# Patient Record
Sex: FEMALE | Race: WHITE | Employment: FULL TIME | URBAN - METROPOLITAN AREA
[De-identification: names, ages, dates, MRNs, and addresses within clinical notes are randomized per-mention and may not be internally consistent; named-entity substitution may affect disease eponyms.]

---

## 2018-12-11 PROBLEM — E66.01 OBESITY, MORBID (HCC): Status: ACTIVE | Noted: 2018-12-11

## 2019-02-27 ENCOUNTER — HOSPITAL ENCOUNTER (EMERGENCY)
Age: 54
Discharge: HOME | End: 2019-02-27
Attending: EMERGENCY MEDICINE
Payer: COMMERCIAL

## 2019-02-27 ENCOUNTER — APPOINTMENT (OUTPATIENT)
Dept: GENERAL RADIOLOGY | Age: 54
End: 2019-02-27
Attending: EMERGENCY MEDICINE
Payer: COMMERCIAL

## 2019-02-27 ENCOUNTER — APPOINTMENT (OUTPATIENT)
Dept: CT IMAGING | Age: 54
End: 2019-02-27
Payer: COMMERCIAL

## 2019-02-27 VITALS
RESPIRATION RATE: 17 BRPM | HEIGHT: 63 IN | SYSTOLIC BLOOD PRESSURE: 149 MMHG | WEIGHT: 275 LBS | OXYGEN SATURATION: 98 % | HEART RATE: 81 BPM | BODY MASS INDEX: 48.73 KG/M2 | DIASTOLIC BLOOD PRESSURE: 73 MMHG | TEMPERATURE: 98 F

## 2019-02-27 DIAGNOSIS — S20.219A CONTUSION OF CHEST WALL, UNSPECIFIED LATERALITY, INITIAL ENCOUNTER: ICD-10-CM
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
## 2019-02-27 DIAGNOSIS — S62.91XA CLOSED FRACTURE OF RIGHT HAND, INITIAL ENCOUNTER: ICD-10-CM

## 2019-02-27 LAB
ANION GAP SERPL CALC-SCNC: 11 MMOL/L (ref 5–15)
BASOPHILS # BLD: 0.1 K/UL (ref 0–0.1)
BASOPHILS NFR BLD: 0 % (ref 0–1)
BUN SERPL-MCNC: 19 MG/DL (ref 6–20)
BUN/CREAT SERPL: 23 (ref 12–20)
CALCIUM SERPL-MCNC: 8.8 MG/DL (ref 8.5–10.1)
CHLORIDE SERPL-SCNC: 103 MMOL/L (ref 97–108)
CO2 SERPL-SCNC: 26 MMOL/L (ref 21–32)
CREAT BLD-MCNC: 0.7 MG/DL (ref 0.6–1.3)
CREAT SERPL-MCNC: 0.83 MG/DL (ref 0.55–1.02)
DIFFERENTIAL METHOD BLD: ABNORMAL
EOSINOPHIL # BLD: 0.3 K/UL (ref 0–0.4)
EOSINOPHIL NFR BLD: 2 % (ref 0–7)
ERYTHROCYTE [DISTWIDTH] IN BLOOD BY AUTOMATED COUNT: 14.5 % (ref 11.5–14.5)
GLUCOSE SERPL-MCNC: 92 MG/DL (ref 65–100)
HCT VFR BLD AUTO: 42 % (ref 35–47)
HGB BLD-MCNC: 13.6 G/DL (ref 11.5–16)
IMM GRANULOCYTES # BLD AUTO: 0.1 K/UL (ref 0–0.04)
IMM GRANULOCYTES NFR BLD AUTO: 1 % (ref 0–0.5)
LYMPHOCYTES # BLD: 3.3 K/UL (ref 0.8–3.5)
LYMPHOCYTES NFR BLD: 19 % (ref 12–49)
MCH RBC QN AUTO: 27.4 PG (ref 26–34)
MCHC RBC AUTO-ENTMCNC: 32.4 G/DL (ref 30–36.5)
MCV RBC AUTO: 84.5 FL (ref 80–99)
MONOCYTES # BLD: 1.1 K/UL (ref 0–1)
MONOCYTES NFR BLD: 6 % (ref 5–13)
NEUTS SEG # BLD: 12.6 K/UL (ref 1.8–8)
NEUTS SEG NFR BLD: 72 % (ref 32–75)
NRBC # BLD: 0 K/UL (ref 0–0.01)
NRBC BLD-RTO: 0 PER 100 WBC
PLATELET # BLD AUTO: 378 K/UL (ref 150–400)
PMV BLD AUTO: 10 FL (ref 8.9–12.9)
POTASSIUM SERPL-SCNC: 3.7 MMOL/L (ref 3.5–5.1)
RBC # BLD AUTO: 4.97 M/UL (ref 3.8–5.2)
SODIUM SERPL-SCNC: 140 MMOL/L (ref 136–145)
WBC # BLD AUTO: 17.4 K/UL (ref 3.6–11)

## 2019-02-27 PROCEDURE — 71046 X-RAY EXAM CHEST 2 VIEWS: CPT

## 2019-02-27 PROCEDURE — 74177 CT ABD & PELVIS W/CONTRAST: CPT

## 2019-02-27 PROCEDURE — 71260 CT THORAX DX C+: CPT

## 2019-02-27 PROCEDURE — 74011636320 HC RX REV CODE- 636/320: Performed by: EMERGENCY MEDICINE

## 2019-02-27 PROCEDURE — 80048 BASIC METABOLIC PNL TOTAL CA: CPT

## 2019-02-27 PROCEDURE — 36415 COLL VENOUS BLD VENIPUNCTURE: CPT

## 2019-02-27 PROCEDURE — 99284 EMERGENCY DEPT VISIT MOD MDM: CPT

## 2019-02-27 PROCEDURE — 75810000053 HC SPLINT APPLICATION

## 2019-02-27 PROCEDURE — 73562 X-RAY EXAM OF KNEE 3: CPT

## 2019-02-27 PROCEDURE — 73130 X-RAY EXAM OF HAND: CPT

## 2019-02-27 PROCEDURE — 82565 ASSAY OF CREATININE: CPT

## 2019-02-27 PROCEDURE — 74011250637 HC RX REV CODE- 250/637: Performed by: EMERGENCY MEDICINE

## 2019-02-27 PROCEDURE — 85025 COMPLETE CBC W/AUTO DIFF WBC: CPT

## 2019-02-27 RX ORDER — SODIUM CHLORIDE 0.9 % (FLUSH) 0.9 %
10 SYRINGE (ML) INJECTION
Status: COMPLETED | OUTPATIENT
Start: 2019-02-27 | End: 2019-02-27

## 2019-02-27 RX ORDER — IBUPROFEN 400 MG/1
800 TABLET ORAL ONCE
Status: COMPLETED | OUTPATIENT
Start: 2019-02-27 | End: 2019-02-27

## 2019-02-27 RX ORDER — SODIUM CHLORIDE 0.9 % (FLUSH) 0.9 %
5-40 SYRINGE (ML) INJECTION AS NEEDED
Status: DISCONTINUED | OUTPATIENT
Start: 2019-02-27 | End: 2019-02-27 | Stop reason: HOSPADM

## 2019-02-27 RX ORDER — SODIUM CHLORIDE 0.9 % (FLUSH) 0.9 %
5-40 SYRINGE (ML) INJECTION EVERY 8 HOURS
Status: DISCONTINUED | OUTPATIENT
Start: 2019-02-27 | End: 2019-02-27 | Stop reason: HOSPADM

## 2019-02-27 RX ORDER — OXYCODONE AND ACETAMINOPHEN 5; 325 MG/1; MG/1
1 TABLET ORAL
Qty: 12 TAB | Refills: 0 | Status: SHIPPED | OUTPATIENT
Start: 2019-02-27 | End: 2019-03-02

## 2019-02-27 RX ORDER — OXYCODONE AND ACETAMINOPHEN 5; 325 MG/1; MG/1
1 TABLET ORAL ONCE
Status: COMPLETED | OUTPATIENT
Start: 2019-02-27 | End: 2019-02-27

## 2019-02-27 RX ADMIN — OXYCODONE AND ACETAMINOPHEN 1 TABLET: 5; 325 TABLET ORAL at 16:49

## 2019-02-27 RX ADMIN — IBUPROFEN 800 MG: 400 TABLET, FILM COATED ORAL at 16:49

## 2019-02-27 RX ADMIN — Medication 10 ML: at 18:35

## 2019-02-27 RX ADMIN — IOPAMIDOL 100 ML: 755 INJECTION, SOLUTION INTRAVENOUS at 18:35
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
## 2019-02-28 NOTE — ED NOTES
Pt discharged by Dr. Antonio Sibley. Pt provided with discharge instructions Rx and instructions on follow up care. Pt out of ED in stable condition accompanied by .

## 2019-03-06 ENCOUNTER — HOSPITAL ENCOUNTER (EMERGENCY)
Age: 54
Discharge: HOME | End: 2019-03-06
Attending: EMERGENCY MEDICINE
Payer: MEDICAID

## 2019-03-06 ENCOUNTER — APPOINTMENT (OUTPATIENT)
Dept: GENERAL RADIOLOGY | Age: 54
End: 2019-03-06
Attending: EMERGENCY MEDICINE
Payer: MEDICAID

## 2019-03-06 ENCOUNTER — APPOINTMENT (OUTPATIENT)
Dept: ULTRASOUND IMAGING | Age: 54
End: 2019-03-06
Attending: EMERGENCY MEDICINE
Payer: MEDICAID

## 2019-03-06 ENCOUNTER — APPOINTMENT (OUTPATIENT)
Dept: CT IMAGING | Age: 54
End: 2019-03-06
Attending: EMERGENCY MEDICINE
Payer: MEDICAID

## 2019-03-06 VITALS
TEMPERATURE: 98.4 F | DIASTOLIC BLOOD PRESSURE: 79 MMHG | BODY MASS INDEX: 51.37 KG/M2 | HEART RATE: 72 BPM | HEIGHT: 63 IN | WEIGHT: 289.9 LBS | SYSTOLIC BLOOD PRESSURE: 148 MMHG | OXYGEN SATURATION: 93 %

## 2019-03-06 DIAGNOSIS — G44.89 OTHER HEADACHE SYNDROME: Primary | ICD-10-CM

## 2019-03-06 DIAGNOSIS — M79.605 LEFT LEG PAIN: ICD-10-CM

## 2019-03-06 DIAGNOSIS — S80.12XA TRAUMATIC HEMATOMA OF LEFT LOWER LEG, INITIAL ENCOUNTER: ICD-10-CM
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
## 2019-03-06 PROCEDURE — 73590 X-RAY EXAM OF LOWER LEG: CPT

## 2019-03-06 PROCEDURE — 93971 EXTREMITY STUDY: CPT

## 2019-03-06 PROCEDURE — 74011250637 HC RX REV CODE- 250/637: Performed by: EMERGENCY MEDICINE

## 2019-03-06 PROCEDURE — 70450 CT HEAD/BRAIN W/O DYE: CPT

## 2019-03-06 PROCEDURE — 99282 EMERGENCY DEPT VISIT SF MDM: CPT

## 2019-03-06 RX ORDER — BUTALBITAL, ACETAMINOPHEN AND CAFFEINE 50; 325; 40 MG/1; MG/1; MG/1
1 TABLET ORAL
Status: DISCONTINUED | OUTPATIENT
Start: 2019-03-06 | End: 2019-03-06 | Stop reason: HOSPADM

## 2019-03-06 RX ORDER — IBUPROFEN 800 MG/1
800 TABLET ORAL
Qty: 20 TAB | Refills: 0 | Status: SHIPPED | OUTPATIENT
Start: 2019-03-06 | End: 2019-03-13
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
## 2019-03-06 NOTE — ED NOTES
Megan Taylor, [facility] has reviewed discharge instructions with the patient. The patient verbalized understanding. Pt ambulatory home with family, discharge papers in hand.

## 2019-03-06 NOTE — LETTER
[facility] EMERGENCY DEPT 
[address] [place] [address] [address] [id number] [phone] Work/School Note Date: 3/6/2019 To Whom It May concern: 
 
Forrest Brown was seen and treated today in the emergency room by the following provider(s): 
Attending Provider: Bety Larios DO Physician Assistant: CAYETANO Fernandez. Forrest Brown may return to work on [id number]. Sincerely, CAYETANO Schaffer

## 2019-04-02 ENCOUNTER — HOSPITAL ENCOUNTER (OUTPATIENT)
Dept: GENERAL RADIOLOGY | Age: 54
Discharge: HOME | End: 2019-04-02
Attending: INTERNAL MEDICINE
Payer: MEDICAID

## 2019-04-02 DIAGNOSIS — R07.9 CHEST PAIN, UNSPECIFIED TYPE: ICD-10-CM

## 2019-04-02 PROCEDURE — 71101 X-RAY EXAM UNILAT RIBS/CHEST: CPT

## 2019-07-16 ENCOUNTER — OFFICE VISIT (OUTPATIENT)
Dept: URGENT CARE | Age: 54
End: 2019-07-16

## 2019-07-16 VITALS
SYSTOLIC BLOOD PRESSURE: 145 MMHG | HEART RATE: 77 BPM | RESPIRATION RATE: 18 BRPM | OXYGEN SATURATION: 96 % | TEMPERATURE: 97.9 F | DIASTOLIC BLOOD PRESSURE: 74 MMHG | WEIGHT: 290 LBS | BODY MASS INDEX: 51.38 KG/M2 | HEIGHT: 63 IN

## 2019-07-16 DIAGNOSIS — J40 BRONCHITIS: Primary | ICD-10-CM

## 2019-07-16 RX ORDER — CLONAZEPAM 0.5 MG/1
0.5 TABLET ORAL
COMMUNITY
End: 2019-07-16

## 2019-07-16 RX ORDER — LEVALBUTEROL INHALATION SOLUTION 0.31 MG/3ML
0.31 SOLUTION RESPIRATORY (INHALATION)
COMMUNITY
Start: 2019-07-16 | End: 2019-07-16

## 2019-07-16 RX ORDER — ZOLPIDEM TARTRATE 5 MG/1
TABLET ORAL
Refills: 1 | COMMUNITY
Start: 2019-06-09 | End: 2020-11-30

## 2019-07-16 RX ORDER — TRAMADOL HYDROCHLORIDE 50 MG/1
50 TABLET ORAL
COMMUNITY
Start: 2015-06-30 | End: 2019-11-12

## 2019-07-16 RX ORDER — ALBUTEROL SULFATE 90 UG/1
2 AEROSOL, METERED RESPIRATORY (INHALATION)
COMMUNITY
Start: 2019-01-17 | End: 2020-01-17

## 2019-07-16 RX ORDER — ALBUTEROL SULFATE 90 UG/1
2 AEROSOL, METERED RESPIRATORY (INHALATION)
COMMUNITY
Start: 2019-07-16 | End: 2019-07-16

## 2019-07-16 RX ORDER — PREDNISONE 10 MG/1
TABLET ORAL
COMMUNITY
Start: 2019-07-16 | End: 2020-11-30

## 2019-07-16 NOTE — PROGRESS NOTES
The history is provided by the patient. Cough   This is a new problem. Episode onset: 3-4 weeks ago. The problem occurs constantly. The problem has not changed since onset. The cough is non-productive. There has been no fever. Associated symptoms include shortness of breath (with exertion) and wheezing. Pertinent negatives include no chest pain, no chills, no sweats, no headaches, no rhinorrhea, no sore throat, no myalgias, no nausea, no vomiting and no confusion. Treatments tried: saw  at [facility], given xopenex and prednisone. The treatment provided moderate relief. She is not a smoker. Her past medical history is significant for pneumonia. Her past medical history does not include bronchitis, COPD or asthma. Past Medical History:   Diagnosis Date    Insomnia 9/1/2015    Lumbago     Obesity 2/4/2015    RLS (restless legs syndrome)         Past Surgical History:   Procedure Laterality Date    HX BACK SURGERY      HX CHOLECYSTECTOMY      HX TUBAL LIGATION           History reviewed. No pertinent family history.      Social History     Socioeconomic History    Marital status:      Spouse name: Not on file    Number of children: Not on file    Years of education: Not on file    Highest education level: Not on file   Occupational History    Not on file   Social Needs    Financial resource strain: Not on file    Food insecurity:     Worry: Not on file     Inability: Not on file    Transportation needs:     Medical: Not on file     Non-medical: Not on file   Tobacco Use    Smoking status: Never Smoker    Smokeless tobacco: Never Used   Substance and Sexual Activity    Alcohol use: Not on file    Drug use: Not on file    Sexual activity: Not on file   Lifestyle    Physical activity:     Days per week: Not on file     Minutes per session: Not on file    Stress: Not on file   Relationships    Social connections:     Talks on phone: Not on file     Gets together: Not on file Attends [employer] service: Not on file     Active member of club or organization: Not on file     Attends meetings of clubs or organizations: Not on file     Relationship status: Not on file    Intimate partner violence:     Fear of current or ex partner: Not on file     Emotionally abused: Not on file     Physically abused: Not on file     Forced sexual activity: Not on file   Other Topics Concern    Not on file   Social History Narrative    Not on file                ALLERGIES: Patient has no known allergies. Review of Systems   Constitutional: Negative for activity change, chills, fatigue and fever. HENT: Negative for rhinorrhea and sore throat. Respiratory: Positive for cough, shortness of breath (with exertion) and wheezing. Negative for choking, chest tightness and stridor. Cardiovascular: Negative for chest pain, palpitations and leg swelling. Gastrointestinal: Negative for abdominal pain, nausea and vomiting. Musculoskeletal: Negative for myalgias. Neurological: Negative for headaches. Psychiatric/Behavioral: Negative for confusion. Vitals:    07/16/19 1154   BP: 145/74   Pulse: 77   Resp: 18   Temp: 97.9 °F (36.6 °C)   SpO2: 96%   Weight: 290 lb (131.5 kg)   Height: 5' 3\" (1.6 m)       Physical Exam   Constitutional: She is oriented to person, place, and time. She appears well-developed and well-nourished. No distress. Elevated BMI   HENT:   Nose: Nose normal.   Mouth/Throat: Oropharynx is clear and moist. No oropharyngeal exudate. Eyes: Pupils are equal, round, and reactive to light. Conjunctivae and EOM are normal. Right eye exhibits no discharge. Left eye exhibits no discharge. No scleral icterus. Neck: Normal range of motion. Neck supple. No JVD present. Cardiovascular: Normal rate, regular rhythm, normal heart sounds and intact distal pulses. No murmur heard. Pulmonary/Chest: Effort normal. No stridor. No respiratory distress. She has wheezes (during cough).  She has no rales. Musculoskeletal: She exhibits no edema. Neurological: She is alert and oriented to person, place, and time. Skin: Skin is warm and dry. No rash noted. She is not diaphoretic. Psychiatric: She has a normal mood and affect. Nursing note and vitals reviewed. MDM     Differential Diagnosis; Clinical Impression; Plan:     Acute bronchitis with ongoing cough. Pt nontoxic, nl resp effort, afebrile. CXR clear. Already rx steroids and albuterol by [employer] DO. No abx targets reached.        Procedures

## 2019-07-16 NOTE — PATIENT INSTRUCTIONS
Bronchitis: Care Instructions  Your Care Instructions    Bronchitis is inflammation of the bronchial tubes, which carry air to the lungs. The tubes swell and produce mucus, or phlegm. The mucus and inflamed bronchial tubes make you cough. You may have trouble breathing. Most cases of bronchitis are caused by viruses like those that cause colds. Antibiotics usually do not help and they may be harmful. Bronchitis usually develops rapidly and lasts about 2 to 3 weeks in otherwise healthy people. Follow-up care is a key part of your treatment and safety. Be sure to make and go to all appointments, and call your doctor if you are having problems. It's also a good idea to know your test results and keep a list of the medicines you take. How can you care for yourself at home? · Take all medicines exactly as prescribed. Call your doctor if you think you are having a problem with your medicine. · Get some extra rest.  · Take an over-the-counter pain medicine, such as acetaminophen (Tylenol), ibuprofen (Advil, Motrin), or naproxen (Aleve) to reduce fever and relieve body aches. Read and follow all instructions on the label. · Do not take two or more pain medicines at the same time unless the doctor told you to. Many pain medicines have acetaminophen, which is Tylenol. Too much acetaminophen (Tylenol) can be harmful. · Take an over-the-counter cough medicine that contains dextromethorphan to help quiet a dry, hacking cough so that you can sleep. Avoid cough medicines that have more than one active ingredient. Read and follow all instructions on the label. · Breathe moist air from a humidifier, hot shower, or sink filled with hot water. The heat and moisture will thin mucus so you can cough it out. · Do not smoke. Smoking can make bronchitis worse. If you need help quitting, talk to your doctor about stop-smoking programs and medicines. These can increase your chances of quitting for good.   When should you call for help? Call 911 anytime you think you may need emergency care. For example, call if:    · You have severe trouble breathing.    Call your doctor now or seek immediate medical care if:    · You have new or worse trouble breathing.     · You cough up dark brown or bloody mucus (sputum).     · You have a new or higher fever.     · You have a new rash.    Watch closely for changes in your health, and be sure to contact your doctor if:    · You cough more deeply or more often, especially if you notice more mucus or a change in the color of your mucus.     · You are not getting better as expected. Where can you learn more? Go to http://marcos-toshia.info/. Enter H333 in the search box to learn more about \"Bronchitis: Care Instructions. \"  Current as of: September 5, 2018  Content Version: 11.9  © 9168-9997 [employer]. Care instructions adapted under license by [employer] (which disclaims liability or warranty for this information). If you have questions about a medical condition or this instruction, always ask your healthcare professional. [address] any warranty or liability for your use of this information.

## 2020-04-20 PROBLEM — S62.91XA HAND FRACTURE, RIGHT: Status: ACTIVE | Noted: 2019-02-27

## 2020-09-18 ENCOUNTER — HOSPITAL ENCOUNTER (OUTPATIENT)
Dept: GENERAL RADIOLOGY | Age: 55
Discharge: HOME | End: 2020-09-18
Attending: SPECIALIST
Payer: COMMERCIAL

## 2020-09-18 DIAGNOSIS — M25.552 LEFT HIP PAIN: ICD-10-CM

## 2020-09-18 PROCEDURE — 73502 X-RAY EXAM HIP UNI 2-3 VIEWS: CPT

## 2020-11-30 PROBLEM — R25.1 TREMOR, UNSPECIFIED: Chronic | Status: ACTIVE | Noted: 2020-11-30

## 2020-12-02 PROBLEM — E78.49 OTHER HYPERLIPIDEMIA: Status: ACTIVE | Noted: 2020-11-30

## 2020-12-08 ENCOUNTER — HOSPITAL ENCOUNTER (OUTPATIENT)
Dept: MAMMOGRAPHY | Age: 55
Discharge: HOME | End: 2020-12-08
Attending: INTERNAL MEDICINE
Payer: COMMERCIAL

## 2020-12-08 DIAGNOSIS — Z12.31 BREAST CANCER SCREENING BY MAMMOGRAM: ICD-10-CM

## 2020-12-08 PROCEDURE — 77067 SCR MAMMO BI INCL CAD: CPT

## 2021-07-08 ENCOUNTER — HOSPITAL ENCOUNTER (EMERGENCY)
Age: 56
Discharge: HOME | End: 2021-07-08
Attending: STUDENT IN AN ORGANIZED HEALTH CARE EDUCATION/TRAINING PROGRAM
Payer: COMMERCIAL

## 2021-07-08 VITALS
RESPIRATION RATE: 22 BRPM | HEART RATE: 71 BPM | TEMPERATURE: 98 F | DIASTOLIC BLOOD PRESSURE: 77 MMHG | SYSTOLIC BLOOD PRESSURE: 131 MMHG | OXYGEN SATURATION: 94 %

## 2021-07-08 DIAGNOSIS — M54.10 BACK PAIN WITH LEFT-SIDED RADICULOPATHY: Primary | ICD-10-CM

## 2021-07-08 PROCEDURE — 96375 TX/PRO/DX INJ NEW DRUG ADDON: CPT

## 2021-07-08 PROCEDURE — 96374 THER/PROPH/DIAG INJ IV PUSH: CPT

## 2021-07-08 PROCEDURE — 74011250636 HC RX REV CODE- 250/636: Performed by: STUDENT IN AN ORGANIZED HEALTH CARE EDUCATION/TRAINING PROGRAM

## 2021-07-08 PROCEDURE — 99283 EMERGENCY DEPT VISIT LOW MDM: CPT

## 2021-07-08 RX ORDER — DIAZEPAM 10 MG/2ML
5 INJECTION INTRAMUSCULAR
Status: COMPLETED | OUTPATIENT
Start: 2021-07-08 | End: 2021-07-08

## 2021-07-08 RX ORDER — CYCLOBENZAPRINE HCL 10 MG
10 TABLET ORAL
Qty: 20 TABLET | Refills: 0 | Status: SHIPPED | OUTPATIENT
Start: 2021-07-08 | End: 2022-02-03

## 2021-07-08 RX ORDER — MORPHINE SULFATE 2 MG/ML
4 INJECTION, SOLUTION INTRAMUSCULAR; INTRAVENOUS
Status: COMPLETED | OUTPATIENT
Start: 2021-07-08 | End: 2021-07-08

## 2021-07-08 RX ORDER — NAPROXEN 500 MG/1
500 TABLET ORAL
Qty: 30 TABLET | Refills: 0 | Status: SHIPPED | OUTPATIENT
Start: 2021-07-08 | End: 2022-02-03

## 2021-07-08 RX ORDER — LIDOCAINE 4 G/100G
1 PATCH TOPICAL EVERY 12 HOURS
Qty: 5 PATCH | Refills: 2 | Status: SHIPPED | OUTPATIENT
Start: 2021-07-08 | End: 2022-06-27

## 2021-07-08 RX ORDER — KETOROLAC TROMETHAMINE 30 MG/ML
30 INJECTION, SOLUTION INTRAMUSCULAR; INTRAVENOUS
Status: COMPLETED | OUTPATIENT
Start: 2021-07-08 | End: 2021-07-08

## 2021-07-08 RX ORDER — PREDNISONE 20 MG/1
40 TABLET ORAL DAILY
Qty: 10 TABLET | Refills: 0 | Status: SHIPPED | OUTPATIENT
Start: 2021-07-08 | End: 2021-07-13

## 2021-07-08 RX ADMIN — DIAZEPAM 5 MG: 5 INJECTION, SOLUTION INTRAMUSCULAR; INTRAVENOUS at 07:46

## 2021-07-08 RX ADMIN — MORPHINE SULFATE 4 MG: 2 INJECTION, SOLUTION INTRAMUSCULAR; INTRAVENOUS at 09:45

## 2021-07-08 RX ADMIN — KETOROLAC TROMETHAMINE 30 MG: 30 INJECTION, SOLUTION INTRAMUSCULAR; INTRAVENOUS at 07:46

## 2021-07-08 NOTE — DISCHARGE INSTRUCTIONS
It was a pleasure taking care of you in our Emergency Department today. We know that when you come to [facility], you are entrusting us with your health, comfort, and safety. Our physicians and nurses honor that trust, and truly appreciate the opportunity to care for you and your loved ones. We also value your feedback. If you receive a survey about your Emergency Department experience today, please fill it out. We care about our patients' feedback, and we listen to what you have to say. Thank you!     Chantel Le MD
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
## 2021-08-17 ENCOUNTER — TRANSCRIBE ORDER (OUTPATIENT)
Dept: SCHEDULING | Age: 56
End: 2021-08-17

## 2021-08-17 DIAGNOSIS — M54.16 LUMBAR RADICULOPATHY: Primary | ICD-10-CM

## 2021-09-07 ENCOUNTER — HOSPITAL ENCOUNTER (OUTPATIENT)
Dept: MRI IMAGING | Age: 56
Discharge: HOME | End: 2021-09-07
Attending: NEUROLOGICAL SURGERY
Payer: COMMERCIAL

## 2021-09-07 DIAGNOSIS — M54.16 LUMBAR RADICULOPATHY: ICD-10-CM

## 2021-09-07 PROCEDURE — 74011250636 HC RX REV CODE- 250/636: Performed by: NEUROLOGICAL SURGERY

## 2021-09-07 PROCEDURE — 72158 MRI LUMBAR SPINE W/O & W/DYE: CPT

## 2021-09-07 PROCEDURE — A9576 INJ PROHANCE MULTIPACK: HCPCS | Performed by: NEUROLOGICAL SURGERY

## 2021-09-07 RX ADMIN — GADOTERIDOL 20 ML: 279.3 INJECTION, SOLUTION INTRAVENOUS at 09:28

## 2021-10-19 ENCOUNTER — TRANSCRIBE ORDER (OUTPATIENT)
Dept: SCHEDULING | Age: 56
End: 2021-10-19

## 2021-10-19 DIAGNOSIS — M25.552 LEFT HIP PAIN: Primary | ICD-10-CM

## 2021-10-26 ENCOUNTER — TRANSCRIBE ORDER (OUTPATIENT)
Dept: SCHEDULING | Age: 56
End: 2021-10-26

## 2021-10-26 DIAGNOSIS — M16.12 PRIMARY OSTEOARTHRITIS OF LEFT HIP: Primary | ICD-10-CM

## 2021-11-03 ENCOUNTER — HOSPITAL ENCOUNTER (OUTPATIENT)
Dept: GENERAL RADIOLOGY | Age: 56
Discharge: HOME | End: 2021-11-03
Attending: PHYSICIAN ASSISTANT
Payer: COMMERCIAL

## 2021-11-03 DIAGNOSIS — M16.12 PRIMARY OSTEOARTHRITIS OF LEFT HIP: ICD-10-CM

## 2021-11-03 PROCEDURE — 74011000250 HC RX REV CODE- 250: Performed by: RADIOLOGY

## 2021-11-03 PROCEDURE — 74011250636 HC RX REV CODE- 250/636: Performed by: RADIOLOGY

## 2021-11-03 PROCEDURE — 74011000250 HC RX REV CODE- 250

## 2021-11-03 PROCEDURE — 74011000636 HC RX REV CODE- 636: Performed by: RADIOLOGY

## 2021-11-03 PROCEDURE — 20610 DRAIN/INJ JOINT/BURSA W/O US: CPT

## 2021-11-03 RX ORDER — SODIUM BICARBONATE 42 MG/ML
1 INJECTION, SOLUTION INTRAVENOUS
Status: COMPLETED | OUTPATIENT
Start: 2021-11-03 | End: 2021-11-03

## 2021-11-03 RX ORDER — LIDOCAINE HYDROCHLORIDE 10 MG/ML
INJECTION, SOLUTION EPIDURAL; INFILTRATION; INTRACAUDAL; PERINEURAL
Status: COMPLETED
Start: 2021-11-03 | End: 2021-11-03

## 2021-11-03 RX ORDER — TRIAMCINOLONE ACETONIDE 40 MG/ML
40 INJECTION, SUSPENSION INTRA-ARTICULAR; INTRAMUSCULAR
Status: COMPLETED | OUTPATIENT
Start: 2021-11-03 | End: 2021-11-03

## 2021-11-03 RX ORDER — BUPIVACAINE HYDROCHLORIDE 5 MG/ML
5 INJECTION, SOLUTION EPIDURAL; INTRACAUDAL
Status: COMPLETED | OUTPATIENT
Start: 2021-11-03 | End: 2021-11-03

## 2021-11-03 RX ADMIN — IOHEXOL 5 ML: 180 INJECTION INTRAVENOUS at 10:00

## 2021-11-03 RX ADMIN — TRIAMCINOLONE ACETONIDE 40 MG: 40 INJECTION, SUSPENSION INTRA-ARTICULAR; INTRAMUSCULAR at 10:00

## 2021-11-03 RX ADMIN — BUPIVACAINE HYDROCHLORIDE 2 ML: 5 INJECTION, SOLUTION EPIDURAL; INTRACAUDAL; PERINEURAL at 10:00

## 2021-11-03 RX ADMIN — SODIUM BICARBONATE 2 ML: 42 INJECTION, SOLUTION INTRAVENOUS at 10:00

## 2021-11-03 RX ADMIN — LIDOCAINE HYDROCHLORIDE 5 ML: 10 INJECTION, SOLUTION EPIDURAL; INFILTRATION; INTRACAUDAL; PERINEURAL at 10:00

## 2022-02-03 ENCOUNTER — OFFICE VISIT (OUTPATIENT)
Dept: ORTHOPEDIC SURGERY | Age: 57
End: 2022-02-03
Payer: COMMERCIAL

## 2022-02-03 VITALS — BODY MASS INDEX: 49.51 KG/M2 | HEIGHT: 64 IN | WEIGHT: 290 LBS

## 2022-02-03 DIAGNOSIS — M16.12 ARTHRITIS OF LEFT HIP: ICD-10-CM

## 2022-02-03 DIAGNOSIS — M25.552 LEFT HIP PAIN: Primary | ICD-10-CM

## 2022-02-03 PROCEDURE — 99204 OFFICE O/P NEW MOD 45 MIN: CPT | Performed by: ORTHOPAEDIC SURGERY

## 2022-02-03 RX ORDER — CELECOXIB 200 MG/1
200 CAPSULE ORAL
COMMUNITY
Start: 2022-01-14 | End: 2022-07-12

## 2022-02-03 NOTE — PROGRESS NOTES
Torie Crane (: 1965) is a 64 y.o. female patient, here for evaluation of the following chief complaint(s):  Hip Pain (left hip pain)       ASSESSMENT/PLAN:  Below is the assessment and plan developed based on review of pertinent history, physical exam, labs, studies, and medications. 27-year-old female comes in today complaining of left-sided hip pain. This has been present for 2 years but has drastically worsened recently over the past 6 months. She is now the point where she cannot weight-bear. She can walk without a cane. Pain is severe and is constant. She has pain during the day as well as at night. She has a severe limp. She struggles with mobility because of it. X-rays revealed destruction of the femoral head with subchondral fracture and significant collapse. We discussed total hip arthroplasty. Her BMI is 49. She has lost 15 pounds and I told her if she can lose another 25 to 30 pounds we can go ahead and schedule her surgery. She still would be at elevated risk but she cannot be mobile because of her severity of disease. We are going to see her in 2 months and can revisit the idea of surgery if she has lost weight I also counseled her nutrition and she is seeing a nutritionist.  I also gave her a note for a aqua therapy program.    1. Left hip pain  -     XR HIP LT W OR WO PELV 2-3 VWS; Future  2. Arthritis of left hip      Encounter Diagnoses   Name Primary?  Left hip pain Yes    Arthritis of left hip         No follow-ups on file. SUBJECTIVE/OBJECTIVE:  Torie Crane (: 1965) is a 64 y.o. female who presents today for the following:  Chief Complaint   Patient presents with    Hip Pain     left hip pain       27-year-old female comes in today complaining of left-sided hip pain. This has been present for 2 years but has drastically worsened recently over the past 6 months. She is now the point where she cannot weight-bear. She can walk without a cane.   Pain is severe and is constant. She has pain during the day as well as at night. She has a severe limp. She struggles with mobility because of it. X-rays revealed destruction of the femoral head with subchondral fracture and significant collapse. IMAGING:  XR Results (most recent):  Results from Appointment encounter on 02/03/22    XR HIP LT W OR WO PELV 2-3 VWS    Narrative  AP and lateral x-rays ordered and independently reviewed left hip. Severe end-stage left hip osteoarthritis with fracture of the femoral head and collapse. No Known Allergies    Current Outpatient Medications   Medication Sig    celecoxib (CELEBREX) 200 mg capsule Take 200 mg by mouth daily.  lidocaine 4 % patch 1 Patch by TransDERmal route every twelve (12) hours every twelve (12) hours.  metoprolol succinate (TOPROL-XL) 50 mg XL tablet TAKE 1 TABLET BY MOUTH EVERY DAY    rOPINIRole (REQUIP) 4 mg tab TAB TAKE 1 TABLET BY MOUTH EVERY DAY AT NIGHT. Same dose/frequency of prior pcp.  escitalopram oxalate (LEXAPRO) 20 mg tablet TAKE 1 TABLET BY MOUTH EVERY DAY. For anxiety.  naproxen (NAPROSYN) 500 mg tablet Take 1 Tablet by mouth every twelve (12) hours as needed for Pain.  cyclobenzaprine (FLEXERIL) 10 mg tablet Take 1 Tablet by mouth three (3) times daily as needed for Muscle Spasm(s). No current facility-administered medications for this visit. Past Medical History:   Diagnosis Date    Asthma     mild intermittent    BMI 50.0-59.9, adult ([facility] [address]     Insomnia 9/1/2015    Lumbago     Obesity (BMI 35.0-39.9 without comorbidity)     HERMELINDA treated with BiPAP     RLS (restless legs syndrome)         Past Surgical History:   Procedure Laterality Date    HX BACK SURGERY      HX CHOLECYSTECTOMY      HX TUBAL LIGATION      MALINA STEREO VAC  BX BREAST LT 1ST LESION W/CLIP AND SPECIMEN Left ?    benign       No family history on file.      Social History     Tobacco Use    Smoking status: Never Smoker    Smokeless tobacco: Never Used   Substance Use Topics    Alcohol use: Not on file        All systems reviewed x 12 and were negative with the exception of None      No flowsheet data found. Vitals:  Ht 5' 4\" (1.626 m)   Wt 290 lb (131.5 kg)   LMP 07/09/2019   BMI 49.78 kg/m²    Body mass index is 49.78 kg/m². Physical Exam    General: NAD, well developed, well nourished. Cardiac: Extremities well perfused. Respiratory: Nonlabored breathing. RLE: No antalgic gait. Negative stinchfield. 0-100 degrees of flexion. >20 degrees internal rotation, >30 degrees external rotation. Negative PATRICK. No pain with flexion adduction and internal rotation. .  Motor strength grossly intact. LLE: Severe antalgic gait. Mild stinchfield. 0-100 degrees of flexion. >20 degrees internal rotation, >30 degrees external rotation. Negative PATRICK. Severe pain with flexion adduction and internal rotation. .  Motor strength grossly intact. Skin: Warm well perfused. Vascular: Palpable pedal pulses bilaterally. Equal. Capillary refill less than 2 seconds. An electronic signature was used to authenticate this note.   -- Jey Mae MD

## 2022-03-17 ENCOUNTER — TRANSCRIBE ORDER (OUTPATIENT)
Dept: SCHEDULING | Age: 57
End: 2022-03-17

## 2022-03-17 DIAGNOSIS — M16.12 PRIMARY OSTEOARTHRITIS OF LEFT HIP: Primary | ICD-10-CM

## 2022-03-18 ENCOUNTER — TRANSCRIBE ORDER (OUTPATIENT)
Dept: SCHEDULING | Age: 57
End: 2022-03-18

## 2022-03-18 DIAGNOSIS — M16.12 PRIMARY OSTEOARTHRITIS OF LEFT HIP: Primary | ICD-10-CM

## 2022-03-18 PROBLEM — R25.1 TREMOR, UNSPECIFIED: Status: ACTIVE | Noted: 2020-11-30

## 2022-03-19 PROBLEM — E66.01 OBESITY, MORBID (HCC): Status: ACTIVE | Noted: 2018-12-11

## 2022-03-19 PROBLEM — E78.49 OTHER HYPERLIPIDEMIA: Status: ACTIVE | Noted: 2020-11-30

## 2022-03-19 PROBLEM — S62.91XA HAND FRACTURE, RIGHT: Status: ACTIVE | Noted: 2019-02-27

## 2022-03-23 ENCOUNTER — HOSPITAL ENCOUNTER (OUTPATIENT)
Dept: GENERAL RADIOLOGY | Age: 57
Discharge: HOME | End: 2022-03-23
Attending: ORTHOPAEDIC SURGERY
Payer: COMMERCIAL

## 2022-03-23 DIAGNOSIS — M16.12 PRIMARY OSTEOARTHRITIS OF LEFT HIP: ICD-10-CM

## 2022-03-23 PROCEDURE — 20610 DRAIN/INJ JOINT/BURSA W/O US: CPT

## 2022-03-23 PROCEDURE — 74011000250 HC RX REV CODE- 250: Performed by: RADIOLOGY

## 2022-03-23 PROCEDURE — 74011250636 HC RX REV CODE- 250/636: Performed by: RADIOLOGY

## 2022-03-23 PROCEDURE — 74011000636 HC RX REV CODE- 636: Performed by: RADIOLOGY

## 2022-03-23 RX ORDER — LIDOCAINE HYDROCHLORIDE 10 MG/ML
5 INJECTION, SOLUTION EPIDURAL; INFILTRATION; INTRACAUDAL; PERINEURAL
Status: COMPLETED | OUTPATIENT
Start: 2022-03-23 | End: 2022-03-23

## 2022-03-23 RX ORDER — SODIUM BICARBONATE 42 MG/ML
2 INJECTION, SOLUTION INTRAVENOUS
Status: COMPLETED | OUTPATIENT
Start: 2022-03-23 | End: 2022-03-23

## 2022-03-23 RX ORDER — LIDOCAINE HYDROCHLORIDE 10 MG/ML
INJECTION INFILTRATION; PERINEURAL
Status: DISPENSED
Start: 2022-03-23 | End: 2022-03-23

## 2022-03-23 RX ORDER — TRIAMCINOLONE ACETONIDE 40 MG/ML
40 INJECTION, SUSPENSION INTRA-ARTICULAR; INTRAMUSCULAR
Status: COMPLETED | OUTPATIENT
Start: 2022-03-23 | End: 2022-03-23

## 2022-03-23 RX ORDER — LIDOCAINE HYDROCHLORIDE 20 MG/ML
5 INJECTION, SOLUTION EPIDURAL; INFILTRATION; INTRACAUDAL; PERINEURAL
Status: DISPENSED | OUTPATIENT
Start: 2022-03-23 | End: 2022-03-23

## 2022-03-23 RX ORDER — BUPIVACAINE HYDROCHLORIDE 5 MG/ML
5 INJECTION, SOLUTION EPIDURAL; INTRACAUDAL
Status: COMPLETED | OUTPATIENT
Start: 2022-03-23 | End: 2022-03-23

## 2022-03-23 RX ADMIN — TRIAMCINOLONE ACETONIDE 40 MG: 40 INJECTION, SUSPENSION INTRA-ARTICULAR; INTRAMUSCULAR at 10:00

## 2022-03-23 RX ADMIN — LIDOCAINE HYDROCHLORIDE 5 ML: 10 INJECTION, SOLUTION EPIDURAL; INFILTRATION; INTRACAUDAL; PERINEURAL at 10:00

## 2022-03-23 RX ADMIN — BUPIVACAINE HYDROCHLORIDE 25 MG: 5 INJECTION, SOLUTION EPIDURAL; INTRACAUDAL; PERINEURAL at 10:00

## 2022-03-23 RX ADMIN — SODIUM BICARBONATE 84 MG: 42 INJECTION, SOLUTION INTRAVENOUS at 10:00

## 2022-03-23 RX ADMIN — IOHEXOL 20 ML: 180 INJECTION INTRAVENOUS at 10:00

## 2022-05-24 ENCOUNTER — OFFICE VISIT (OUTPATIENT)
Dept: ORTHOPEDIC SURGERY | Age: 57
End: 2022-05-24
Payer: COMMERCIAL

## 2022-05-24 VITALS — WEIGHT: 275 LBS | HEIGHT: 64 IN | BODY MASS INDEX: 46.95 KG/M2

## 2022-05-24 DIAGNOSIS — M16.12 ARTHRITIS OF LEFT HIP: Primary | ICD-10-CM

## 2022-05-24 PROCEDURE — 99213 OFFICE O/P EST LOW 20 MIN: CPT | Performed by: ORTHOPAEDIC SURGERY

## 2022-05-25 NOTE — PROGRESS NOTES
Ernie Loco (: 1965) is a 64 y.o. female patient, here for evaluation of the following chief complaint(s):  Hip Pain (left hip pain)       ASSESSMENT/PLAN:  Below is the assessment and plan developed based on review of pertinent history, physical exam, labs, studies, and medications. 22-year-old female comes in today for follow-up. She has significant left hip pain. Pain is severe and constant. She has destruction of the femoral head with subchondral fracture and significant collapse based on previous x-rays. Ultimately she needs a total hip replacement. She is working on weight loss. Previous BMI is 49, today is 47. She has lost an additional 15 pounds since previous visit. Discussed with patient she would need to lose about another 15 pounds to make her a better surgical candidate. Patient verbalizes understanding. She continues to take Celebrex daily as prescribed by her PCP, continue with this. Prescription given for physical therapy for aqua therapy. Continue to work on weight loss. Follow-up in 8 to 12 weeks once closer to goal weight and we will plan to further discuss and schedule a left total hip replacement at patient's earliest convenience. 1. Arthritis of left hip  -     REFERRAL TO PHYSICAL THERAPY      Encounter Diagnosis   Name Primary?  Arthritis of left hip Yes        No follow-ups on file. SUBJECTIVE/OBJECTIVE:  Ernie Loco (: 1965) is a 64 y.o. female who presents today for the following:  Chief Complaint   Patient presents with    Hip Pain     left hip pain       22-year-old female comes in today for left hip pain. Has been present for 2+ years. She has significant pain. She can barely weight-bear on that side. Pain is severe and constant. She has pain during the day and at night. Has a severe limp. She struggles with mobility secondary to it. She continues to work on weight loss. Has lost a total of 30 pounds, goal for another 15. States she does best with aquatics therapy. IMAGING:  XR Results (most recent):  Results from Hospital Encounter encounter on 22    XR INJ ASP LARGE JOINT / BURSA    Narrative  PATIENT NAME: Aneudy Camarena  AGE, : 64 years, 1965  MRN: [id number]  DATE: 3/23/2022 12:11 PM    EXAM: HIP INJECTION    SUPERVISING PHYSICIAN: Raoul Parker MD  OPERATING PROVIDER: Cristobal Fletcher. ZOLTAN Jaramillo    INDICATION: Left hip osteoarthritis and left hip pain    TECHNIQUE: After discussion of the risks and benefits of a left hip joint  injection, all questions were answered and written consent was obtained. An appropriate site for joint injection was marked on the skin over the left hip  joint. The patient was prepped and draped in sterile fashion. Using sterile  technique and 1% lidocaine for local anesthetic, a 22-gauge spinal needle was  fluoroscopically introduced into the left hip joint followed by injection of a  small amount of Omnipaque-180 to confirm intra-articular positioning. Subsequently, injection into the hip joint is made consisting of 4mL bupivacaine  0.5% and 40 mg Kenalog. The patient tolerated the procedure well without  complication. The patient noted improvement of usual pain after the procedure  and was instructed to continue monitoring symptoms over the next 24 hours, and  report the degree of symptomatic pain relief to referring provider on follow up  visit. RADIATION:  Air Kerma: 81.09 mGy    Impression  Status post technically successful left hip injection with  symptomatic pain relief. No Known Allergies    Current Outpatient Medications   Medication Sig    celecoxib (CELEBREX) 200 mg capsule Take 200 mg by mouth daily.  lidocaine 4 % patch 1 Patch by TransDERmal route every twelve (12) hours every twelve (12) hours.     metoprolol succinate (TOPROL-XL) 50 mg XL tablet TAKE 1 TABLET BY MOUTH EVERY DAY    rOPINIRole (REQUIP) 4 mg tab TAB TAKE 1 TABLET BY MOUTH EVERY DAY AT NIGHT. Same dose/frequency of prior pcp.  escitalopram oxalate (LEXAPRO) 20 mg tablet TAKE 1 TABLET BY MOUTH EVERY DAY. For anxiety. No current facility-administered medications for this visit. Past Medical History:   Diagnosis Date    Asthma     mild intermittent    BMI 50.0-59.9, adult ([address]     Insomnia 9/1/2015    Lumbago     Obesity (BMI 35.0-39.9 without comorbidity)     HERMELINDA treated with BiPAP     RLS (restless legs syndrome)         Past Surgical History:   Procedure Laterality Date    HX BACK SURGERY      HX CHOLECYSTECTOMY      HX TUBAL LIGATION      MALINA STEREO VAC  BX BREAST LT 1ST LESION W/CLIP AND SPECIMEN Left ?    benign       No family history on file. Social History     Tobacco Use    Smoking status: Never Smoker    Smokeless tobacco: Never Used   Substance Use Topics    Alcohol use: Not on file        All systems reviewed x 12 and were negative with the exception of None      No flowsheet data found. Vitals:  Ht 5' 4\" (1.626 m)   Wt 275 lb (124.7 kg)   LMP 07/09/2019   BMI 47.20 kg/m²    Body mass index is 47.2 kg/m². Physical Exam    General: NAD, well developed, well nourished. Cardiac: Extremities well perfused. Respiratory: Nonlabored breathing. RLE:   Negative stinchfield. 0-100 degrees of flexion. >20 degrees internal rotation, >30 degrees external rotation. Negative PATRICK. No pain with flexion adduction and internal rotation. .  Motor strength grossly intact. LLE: Significant antalgic gait. Significant stinchfield. 0-100 degrees of flexion. >20 degrees internal rotation, >30 degrees external rotation. Negative PATRICK. Significant pain with flexion adduction and internal rotation. .  Motor strength grossly intact. Skin: Warm well perfused. Vascular: Palpable pedal pulses bilaterally. Equal. Capillary refill less than 2 seconds. Paresh Burleson M.D. was available for immediate consultation as the supervising physician.         An electronic signature was used to authenticate this note.   -- Missy Kyle PA-C

## 2022-06-02 ENCOUNTER — OFFICE VISIT (OUTPATIENT)
Dept: ORTHOPEDIC SURGERY | Age: 57
End: 2022-06-02
Payer: COMMERCIAL

## 2022-06-02 VITALS — HEIGHT: 64 IN | BODY MASS INDEX: 46.95 KG/M2 | WEIGHT: 275 LBS

## 2022-06-02 DIAGNOSIS — M16.12 PRIMARY OSTEOARTHRITIS OF LEFT HIP: Primary | ICD-10-CM

## 2022-06-02 PROCEDURE — 99214 OFFICE O/P EST MOD 30 MIN: CPT | Performed by: ORTHOPAEDIC SURGERY

## 2022-06-02 NOTE — PROGRESS NOTES
Ernie Loco (: 1965) is a 64 y.o. female, patient, here for evaluation of the following chief complaint(s):  Hip Pain (internal referral from Dr. Slick Aponte for left hip pain )       HPI:    End-stage joint destruction. Basically no one will replace her hip due to her BMI. Patient can barely walk. No Known Allergies    Current Outpatient Medications   Medication Sig    celecoxib (CELEBREX) 200 mg capsule Take 200 mg by mouth daily.  lidocaine 4 % patch 1 Patch by TransDERmal route every twelve (12) hours every twelve (12) hours.  metoprolol succinate (TOPROL-XL) 50 mg XL tablet TAKE 1 TABLET BY MOUTH EVERY DAY    rOPINIRole (REQUIP) 4 mg tab TAB TAKE 1 TABLET BY MOUTH EVERY DAY AT NIGHT. Same dose/frequency of prior pcp.  escitalopram oxalate (LEXAPRO) 20 mg tablet TAKE 1 TABLET BY MOUTH EVERY DAY. For anxiety. No current facility-administered medications for this visit. Past Medical History:   Diagnosis Date    Asthma     mild intermittent    BMI 50.0-59.9, adult ([facility] [address]     Insomnia 2015    Lumbago     Obesity (BMI 35.0-39.9 without comorbidity)     HERMELINDA treated with BiPAP     RLS (restless legs syndrome)         Past Surgical History:   Procedure Laterality Date    HX BACK SURGERY      HX CHOLECYSTECTOMY      HX TUBAL LIGATION      MALINA STEREO VAC  BX BREAST LT 1ST LESION W/CLIP AND SPECIMEN Left ?    benign       History reviewed. No pertinent family history.      Social History     Socioeconomic History    Marital status:      Spouse name: Not on file    Number of children: Not on file    Years of education: Not on file    Highest education level: Not on file   Occupational History    Not on file   Tobacco Use    Smoking status: Never Smoker    Smokeless tobacco: Never Used   Substance and Sexual Activity    Alcohol use: Not on file    Drug use: Not on file    Sexual activity: Not on file   Other Topics Concern    Not on file   Social History Narrative    Not on file     Social Determinants of Health     Financial Resource Strain:     Difficulty of Paying Living Expenses: Not on file   Food Insecurity:     Worried About Running Out of Food in the Last Year: Not on file    Yoko of Food in the Last Year: Not on file   Transportation Needs:     Lack of Transportation (Medical): Not on file    Lack of Transportation (Non-Medical): Not on file   Physical Activity:     Days of Exercise per Week: Not on file    Minutes of Exercise per Session: Not on file   Stress:     Feeling of Stress : Not on file   Social Connections:     Frequency of Communication with Friends and Family: Not on file    Frequency of Social Gatherings with Friends and Family: Not on file    Attends Cheondoism Services: Not on file    Active Member of [address] [place] [address] [employer] or Organizations: Not on file    Attends Club or Organization Meetings: Not on file    Marital Status: Not on file   Intimate Partner Violence:     Fear of Current or Ex-Partner: Not on file    Emotionally Abused: Not on file    Physically Abused: Not on file    Sexually Abused: Not on file   Housing Stability:     Unable to Pay for Housing in the Last Year: Not on file    Number of [place] in the Last Year: Not on file    Unstable Housing in the Last Year: Not on file       ROS     Positive for: Musculoskeletal    Last edited by Yakelin Quispe on 6/2/2022  3:29 PM. (History)            Vitals:  Ht 5' 4\" (1.626 m)   Wt 275 lb (124.7 kg)   LMP 07/09/2019   BMI 47.20 kg/m²    Body mass index is 47.2 kg/m². PHYSICAL EXAM:  Not a candidate for anterior hip replacement due to overhanging pannus and significant redness in the anterior hip incision area. Posterior hip incision reasonable. She still quite obese but skin is in good condition. Has no range of motion and severe pain. Patient can barely walk and uses a cane. IMAGING:  No new films.   Prior films shows tonus stage III or more osteoarthritis with head collapse and acetabular bone loss. ASSESSMENT/PLAN:  1. Primary osteoarthritis of left hip    Severe DJD left hip. Collapse. Patient has intractable pain. Nothing scan to help her besides a hip replacement. I have set her up for water aerobics. Set her up for a home exercise program.  She is going to follow a low-carb diet try to lose as much weight as possible and will consider her surgery in July. She was tentatively scheduled. We discussed continued conservative treatment measures versus total joint replacement. Symptoms have progressed despite conservative treatment measures outlined above and they desire to proceed with left total hip. Patient has had symptoms for over 12 months. They have decline in their activities of daily living with inability to walk long distances. There has been progressive decline in function. Discussed risks, benefits, and alternatives in detail, as well as anticipated hospital stay and course of rehabilitation. They will see their primary care physician prior to surgery. All questions answered. An electronic signature was used to authenticate this note.   --Landon Lay MD

## 2022-06-07 DIAGNOSIS — M16.12 PRIMARY OSTEOARTHRITIS OF LEFT HIP: Primary | ICD-10-CM

## 2022-06-27 ENCOUNTER — HOSPITAL ENCOUNTER (OUTPATIENT)
Dept: PREADMISSION TESTING | Age: 57
Discharge: HOME | End: 2022-06-27
Payer: COMMERCIAL

## 2022-06-27 VITALS
HEIGHT: 64 IN | SYSTOLIC BLOOD PRESSURE: 114 MMHG | DIASTOLIC BLOOD PRESSURE: 73 MMHG | HEART RATE: 62 BPM | TEMPERATURE: 97.9 F | BODY MASS INDEX: 46.71 KG/M2 | WEIGHT: 273.59 LBS

## 2022-06-27 LAB
ABO + RH BLD: NORMAL
ANION GAP SERPL CALC-SCNC: 4 MMOL/L (ref 5–15)
APPEARANCE UR: CLEAR
ATRIAL RATE: 63 BPM
BACTERIA URNS QL MICRO: ABNORMAL /HPF
BILIRUB UR QL: NEGATIVE
BLOOD GROUP ANTIBODIES SERPL: NORMAL
BUN SERPL-MCNC: 21 MG/DL (ref 6–20)
BUN/CREAT SERPL: 28 (ref 12–20)
CALCIUM SERPL-MCNC: 9.4 MG/DL (ref 8.5–10.1)
CALCULATED R AXIS, ECG10: 20 DEGREES
CALCULATED T AXIS, ECG11: 0 DEGREES
CHLORIDE SERPL-SCNC: 106 MMOL/L (ref 97–108)
CO2 SERPL-SCNC: 30 MMOL/L (ref 21–32)
COLOR UR: ABNORMAL
CREAT SERPL-MCNC: 0.75 MG/DL (ref 0.55–1.02)
DIAGNOSIS, 93000: NORMAL
EPITH CASTS URNS QL MICRO: ABNORMAL /LPF
ERYTHROCYTE [DISTWIDTH] IN BLOOD BY AUTOMATED COUNT: 14.4 % (ref 11.5–14.5)
EST. AVERAGE GLUCOSE BLD GHB EST-MCNC: 114 MG/DL
GLUCOSE SERPL-MCNC: 86 MG/DL (ref 65–100)
GLUCOSE UR STRIP.AUTO-MCNC: NEGATIVE MG/DL
HBA1C MFR BLD: 5.6 % (ref 4–5.6)
HCT VFR BLD AUTO: 44.1 % (ref 35–47)
HGB BLD-MCNC: 14 G/DL (ref 11.5–16)
HGB UR QL STRIP: NEGATIVE
HYALINE CASTS URNS QL MICRO: ABNORMAL /LPF (ref 0–5)
INR PPP: 1 (ref 0.9–1.1)
KETONES UR QL STRIP.AUTO: NEGATIVE MG/DL
LEUKOCYTE ESTERASE UR QL STRIP.AUTO: NEGATIVE
MCH RBC QN AUTO: 27.1 PG (ref 26–34)
MCHC RBC AUTO-ENTMCNC: 31.7 G/DL (ref 30–36.5)
MCV RBC AUTO: 85.5 FL (ref 80–99)
NITRITE UR QL STRIP.AUTO: NEGATIVE
NRBC # BLD: 0 K/UL (ref 0–0.01)
NRBC BLD-RTO: 0 PER 100 WBC
P-R INTERVAL, ECG05: 164 MS
PH UR STRIP: 5.5 [PH] (ref 5–8)
PLATELET # BLD AUTO: 356 K/UL (ref 150–400)
PMV BLD AUTO: 10.8 FL (ref 8.9–12.9)
POTASSIUM SERPL-SCNC: 4.2 MMOL/L (ref 3.5–5.1)
PROT UR STRIP-MCNC: NEGATIVE MG/DL
PROTHROMBIN TIME: 10 SEC (ref 9–11.1)
Q-T INTERVAL, ECG07: 414 MS
QRS DURATION, ECG06: 86 MS
QTC CALCULATION (BEZET), ECG08: 423 MS
RBC # BLD AUTO: 5.16 M/UL (ref 3.8–5.2)
RBC #/AREA URNS HPF: ABNORMAL /HPF (ref 0–5)
SODIUM SERPL-SCNC: 140 MMOL/L (ref 136–145)
SP GR UR REFRACTOMETRY: 1.02 (ref 1–1.03)
SPECIMEN EXP DATE BLD: NORMAL
UA: UC IF INDICATED,UAUC: ABNORMAL
UROBILINOGEN UR QL STRIP.AUTO: 0.2 EU/DL (ref 0.2–1)
VENTRICULAR RATE, ECG03: 63 BPM
WBC # BLD AUTO: 7 K/UL (ref 3.6–11)
WBC URNS QL MICRO: ABNORMAL /HPF (ref 0–4)

## 2022-06-27 PROCEDURE — 85027 COMPLETE CBC AUTOMATED: CPT

## 2022-06-27 PROCEDURE — 83036 HEMOGLOBIN GLYCOSYLATED A1C: CPT

## 2022-06-27 PROCEDURE — 86900 BLOOD TYPING SEROLOGIC ABO: CPT

## 2022-06-27 PROCEDURE — 93005 ELECTROCARDIOGRAM TRACING: CPT

## 2022-06-27 PROCEDURE — 85610 PROTHROMBIN TIME: CPT

## 2022-06-27 PROCEDURE — 80048 BASIC METABOLIC PNL TOTAL CA: CPT

## 2022-06-27 PROCEDURE — 81001 URINALYSIS AUTO W/SCOPE: CPT

## 2022-06-27 NOTE — PERIOP NOTES
[address]  ORTHOPAEDIC    Surgery Date:   7/12/22    Your surgeon's office or [facility] staff will call you between 4 PM- 8 PM the day before surgery with your arrival time. If your surgery is on a Monday, you will receive a call the preceding Friday. 1. Please report to [facility] Patient Access/Admitting on the 1st floor. Bring your insurance card, photo identification, and any copayment (if applicable). 2. If you are going home the same day of your surgery, you must have a responsible adult to drive you home. You need to have a responsible adult to stay with you the first 24 hours after surgery and you should not drive a car for 24 hours following your surgery. 3. Do NOT eat any solid foods after midnight the night before surgery including candy, mints or gum. You may drink clear liquids from midnight until 1 hour prior to arrival time. You may drink up to 12 ounces at one time every 4 hours. 4. Do NOT drink alcohol or smoke 24 hours before surgery. STOP smoking for 14 days prior as it helps with breathing and healing after surgery. 5. If your arrival time is 3pm or later, you may eat a light breakfast before 8am (toast, bagel-no butter, black coffee, plain tea, fruit juice-no pulp) Please note special instructions, if applicable, below for medications. 6. If you are being admitted to the hospital,please leave personal belongings/luggage in your car until you have an assigned hospital room number. 7. Please wear comfortable clothes. Wear your glasses instead of contacts. We ask that all money, jewelry and valuables be left at home. Wear no make up, particularly mascara, the day of surgery. 8.  All body piercings, rings, and jewelry need to be removed and left at home. Please remove any nail polish or artificial nails from your fingernails. Please wear your hair loose or down. Please no pony-tails, buns, or any metal hair accessories.  If you shower the morning of surgery, please do not apply any lotions or powders afterwards. You may wear deodorant. Do not shave any body area within 24 hours of your surgery. 9. Please follow all instructions to avoid any potential surgical cancellation. 10. Should your physical condition change, (i.e. fever, cold, flu, etc.) please notify your surgeon as soon as possible. 11. It is important to be on time. If a situation occurs where you may be delayed, please call:  [phone] / [phone] on the day of surgery. 12. The Preadmission Testing staff can be reached at [phone]. 13. Special instructions: N/A    Current Outpatient Medications   Medication Sig    celecoxib (CELEBREX) 200 mg capsule Take 200 mg by mouth nightly.  metoprolol succinate (TOPROL-XL) 50 mg XL tablet TAKE 1 TABLET BY MOUTH EVERY DAY (Patient taking differently: Take 50 mg by mouth nightly. TAKE 1 TABLET BY MOUTH EVERY DAY)    rOPINIRole (REQUIP) 4 mg tab TAB TAKE 1 TABLET BY MOUTH EVERY DAY AT NIGHT. Same dose/frequency of prior pcp. (Patient taking differently: Take 4 mg by mouth nightly. TAKE 1 TABLET BY MOUTH EVERY DAY AT NIGHT. Same dose/frequency of prior pcp.)    escitalopram oxalate (LEXAPRO) 20 mg tablet TAKE 1 TABLET BY MOUTH EVERY DAY. For anxiety. (Patient taking differently: Take 20 mg by mouth nightly. TAKE 1 TABLET BY MOUTH EVERY DAY. For anxiety.)    liraglutide (SAXENDA SC) 18 mg by SubCUTAneous route nightly. (Patient not taking: Reported on 6/27/2022)     No current facility-administered medications for this encounter. 1. YOU MUST ONLY TAKE THESE MEDICATIONS THE MORNING OF SURGERY WITH A SIP OF WATER: N/A  2. MEDICATIONS TO TAKE THE MORNING OF SURGERY ONLY IF NEEDED: N/A  3. HOLD these prescription medications BEFORE Surgery: N/A  4. Ask your surgeon/prescribing physician about when/if to STOP taking these medications: SAXENDA  5.  Stop any non-steroidal anti-inflammatory drugs (i.e. Ibuprofen, Naproxen, Advil, Aleve) 3 days before surgery. You may take Tylenol. STOP all vitamins and herbal supplements 1 week prior to  surgery. 6. If you are currently taking Plavix, Coumadin, or any other blood-thinning/anticoagulant medication contact your prescribing physician for instructions. Preventing Infections Before and After - Your Surgery    IMPORTANT INSTRUCTIONS    You play an important role in your health and preparation for surgery. To reduce the germs on your skin you will need to shower with CHG soap (Chorhexidine gluconate 4%) two times before surgery. CHG soap (Hibiclens, Hex-A-Clens or store brand)   CHG soap will be provided at your Preadmission Testing (PAT) appointment.  If you do not have a PAT appointment before surgery, you may arrange to  CHG soap from our office or purchase CHG soap at a pharmacy, grocery or department store.  You need to purchase TWO 4 ounce bottles to use for your 2 showers. Steps to follow:  1. Wash your hair with your normal shampoo and your body with regular soap and rinse well to remove shampoo and soap from your skin. 2. Wet a clean washcloth and turn off the shower. 3. Put CHG soap on washcloth and apply to your entire body from the neck down. Do not use on your head, face or private parts(genitals). Do not use CHG soap on open sores, wounds or areas of skin irritation. 4. Wash you body gently for 5 minutes. Do not wash your skin too hard. This soap does not create lather. Pay special attention to your underarms and from your belly button to your feet. 5. Turn the shower back on and rinse well to get CHG soap off your body. 6. Pat your skin dry with a clean, dry towel. Do not apply lotions or moisturizer. 7. Put on clean clothes and sleep on fresh bed sheets and do not allow pets to sleep with you.     Shower with CHG soap 2 times before your surgery   The evening before your surgery   The morning of your surgery      Tips to help prevent infections after your surgery:  1. Protect your surgical wound from germs:  ? Hand washing is the most important thing you and your caregivers can do to prevent infections. ? Keep your bandage clean and dry! ? Do not touch your surgical wound. 2. Use clean, freshly washed towels and washcloths every time you shower; do not share bath linens with others. 3. Until your surgical wound is healed, wear clothing and sleep on bed linens each day that are clean and freshly washed. 4. Do not allow pets to sleep in your bed with you or touch your surgical wound. 5. Do not smoke - smoking delays wound healing. This may be a good time to stop smoking. 6. If you have diabetes, it is important for you to manage your blood sugar levels properly before your surgery as well as after your surgery. Poorly managed blood sugar levels slow down wound healing and prevent you from healing completely. Prevention of Infection  Testing for Staphylococcus aureus on your skin before surgery    Staphylococcus aureus (staph) is a common bacteria that is found on the body. It normally does not cause infection on healthy skin. Before surgery, you will be tested to see if you have staph by swabbing the inside of your nose. When you have an incision with surgery, the goal is to protect that incision from infection. Removal of the staph bacteria before surgery can decrease the risk of a surgical site infection. If your nose swab is positive for staph you will be called. Your treatment will include 2 steps:   Prescription for Mupirocin ointment to be used in each nostril twice a day for 5 days.  Showering with Chlorhexidine (CHG) liquid soap for 5 days prior to surgery. How to use Mupirocin ointment in your nose  1.  the prescription from your pharmacy. You will receive a large tube of ointment which will be big enough for all of your treatments. You will apply this ointment to each nostril 2 times a day for 5 days.   2. Wash your hands with  gel or soap and water for 20 seconds before using ointment. 3. Place a pea-sized amount of ointment on a cotton Q-tip. 4. Apply ointment just inside of each nostril with the Q-tip. Do not push Q-tip or ointment deep inside you nose. 5. Press your nostrils together and massage for a few seconds. 6. Wash your hands with  gel or soap and water after you are finished. 7. Do not get ointment near your eyes. If it gets into your eyes, rinse them with cool water. 8. If you need to use nasal spray, clean the tip of the bottle with alcohol before use and do not use both at the same time. 9. If you are scheduled for COVID testing during the 5 days, do NOT apply morning dose until after the COVID test has been performed. How to use Chlorhexidine (CHG) 4% liquid soap  1. Purchase an 8 ounce bottle of CHG liquid soap (Chlorhexidine 4%, Hibiclens, Hex-A-Clens or store brand) at a pharmacy or grocery store. 2. Wash your hair with your normal shampoo and your body with regular soap and rinse well to remove shampoo and soap from your skin. 3. Wet a clean washcloth and turn off the shower. 4. Put CHG soap on washcloth and apply to your entire body from the neck down. Do not use on your head, face or private parts(genitals). Do not use CHG soap on open sores, wounds or areas of skin irritation. 5. Wash your body gently for 5 minutes. Do not wash your skin too hard. This soap does not create lather. Pay special attention to your underarms and from your belly button to your feet. 6. Turn the shower back on and rinse well to get CHG soap off your body. 7. Pat your skin dry with a clean, dry towel. Do not apply lotions or moisturizer. 8. Put on clean clothes and sleep on fresh bed sheets the night before surgery. Do not allow pets to sleep with you. Eating and Drinking Before Surgery     You may eat a regular dinner at the usual time on the day before your surgery.    Do NOT eat any solid foods after midnight unless your arrival time at the hospital is 3pm or later.  You may drink clear liquids only from 12 midnight until 1 hours prior to your arrival time at the hospital on the day of your surgery. Do NOT drink alcohol.  Clear liquids include:  o Water  o Fruit juices without pulp( i.e. apple juice)  o Carbonated beverages  o Black coffee (no cream/milk)  o Tea (no cream/milk)  o Gatorade   You may drink up to 12-16 ounces at one time every 4 hours between the hours of midnight and 1 hour before your arrival time at the hospital. Example- if your arrival time at the hospital is 6am, you may drink 12-16 ounces of clear liquids no later than 5am.   If your arrival time at the hospital is 3pm or later, you may eat a light breakfast before 8am.   A light breakfast includes:  o Toast or bagel (no butter)  o Black coffee (no cream/milk)  o Tea (no cream/milk)  o Fruit juices without pulp ( i.e. apple juice)  o Do NOT eat meat, eggs, vegetables or fruit   If you have any questions, please contact your surgeon's office. Patient Information Regarding COVID Restrictions    Day of Procedure     Please park in the parking deck or any designated visitor parking lot.  Enter the facility through the [facility] of the hospitals.   On the day of surgery, please provide the cell phone number of the person who will be waiting for you to the Patient Access representative at the time of registration.  Please wear a mask on the day of your procedure.  We are now allowing two designated visitors per stay. Pediatric patients may have 2 designated visitors. These two people may come in with you on the day of your procedure.  The designated visitor must also wear a mask.    Once your procedure and the immediate recovery period is completed, a nurse in the recovery area will contact your designated visitor to inform them of your room number or to otherwise review other pertinent information regarding your care.  Social distancing practices are to be adhered to in waiting areas and the cafeteria. The patient was contacted in person. She verbalized understanding of all instructions does not  need reinforcement.

## 2022-06-28 LAB
BACTERIA SPEC CULT: NORMAL
BACTERIA SPEC CULT: NORMAL
SERVICE CMNT-IMP: NORMAL

## 2022-06-28 NOTE — PERIOP NOTES
PAT Nurse Practitioner   Pre-Operative Chart Review/Assessment:-ORTHOPEDIC                Patient Name:  Ab Chavez                                                           Age:   64 y.o.    :  1965     Today's Date:  2022     Date of PAT:   2022      Date of Surgery:    2022      Procedure(s):  Left Total Hip Arthroplasty     Surgeon:   Dr. Kevin Staples                       PLAN:      1)  Medical Clearance: Patricia Jon NP      2)  Cardiac Clearance:  EKG and METs reviewed. No further cardiac evaluation requested. PAT EKG: normal EKG, normal sinus rhythm. 3)  Diabetic Treatment Consult:  Not indicated. A1c-5.6      4)  Sleep Apnea evaluation:   +HERMELINDA dx. Pt uses BiPAP. 5) Treatment for MRSA/Staph Aureus:  +MSSA. Tx w/ mupirocin      6) Additional Concerns:  Nicotine vapor use(quit 2 weeks PTS), Occ THC use, HTN, GERD, asthma, anxiety, obesity, hx of lumbar fusion                Vital Signs:         Vitals:    22 0811   BP: 114/73   Pulse: 62   Temp: 97.9 °F (36.6 °C)   Weight: 124.1 kg (273 lb 9.5 oz)   Height: 5' 3.75\" (1.619 m)   LMP: 2019            ____________________________________________  PAST MEDICAL HISTORY  Past Medical History:   Diagnosis Date    Anxiety     Arthritis     Asthma     mild intermittent    BMI 50.0-59.9, adult ([facility] [address]     GERD (gastroesophageal reflux disease)     Hypertension     Insomnia 2015    Lumbago     Obesity (BMI 35.0-39.9 without comorbidity)     HERMELINDA treated with BiPAP     RLS (restless legs syndrome)       ____________________________________________  PAST SURGICAL HISTORY  Past Surgical History:   Procedure Laterality Date    HX CERVICAL FUSION       [facility], [address]    HX CHOLECYSTECTOMY      HX COLONOSCOPY      HX LUMBAR DISKECTOMY       [facility], [address]    HX LUMBAR FUSION        [facility], [address]    HX TONSILLECTOMY      CHILD    HX TUBAL LIGATION      HX WISDOM TEETH EXTRACTION 1980's    X2    MALINA STEREO VAC  BX BREAST LT 1ST LESION W/CLIP AND SPECIMEN Left ?    benign      ____________________________________________  HOME MEDICATIONS  Current Outpatient Medications   Medication Sig    celecoxib (CELEBREX) 200 mg capsule Take 200 mg by mouth nightly.  metoprolol succinate (TOPROL-XL) 50 mg XL tablet TAKE 1 TABLET BY MOUTH EVERY DAY (Patient taking differently: Take 50 mg by mouth nightly. TAKE 1 TABLET BY MOUTH EVERY DAY)    rOPINIRole (REQUIP) 4 mg tab TAB TAKE 1 TABLET BY MOUTH EVERY DAY AT NIGHT. Same dose/frequency of prior pcp. (Patient taking differently: Take 4 mg by mouth nightly. TAKE 1 TABLET BY MOUTH EVERY DAY AT NIGHT. Same dose/frequency of prior pcp.)    escitalopram oxalate (LEXAPRO) 20 mg tablet TAKE 1 TABLET BY MOUTH EVERY DAY. For anxiety. (Patient taking differently: Take 20 mg by mouth nightly. TAKE 1 TABLET BY MOUTH EVERY DAY. For anxiety.)    liraglutide (SAXENDA SC) 18 mg by SubCUTAneous route nightly. (Patient not taking: Reported on 6/27/2022)     No current facility-administered medications for this encounter.      ____________________________________________  ALLERGIES  No Known Allergies   ____________________________________________  SOCIAL HISTORY  Social History     Tobacco Use    Smoking status: Never Smoker    Smokeless tobacco: Never Used   Substance Use Topics    Alcohol use:  Yes     Alcohol/week: 2.0 standard drinks     Types: 1 Glasses of wine, 1 Shots of liquor per week     Comment: 5 WEEK (1 DRINK NIGHTLY)      ____________________________________________   Internal Administration   First Dose COVID-19, PFIZER PURPLE top, DILUTE for use, (age 15 y+), IM, 30mcg/0.3mL  04/06/2021   Second Dose COVID-19, PFIZER PURPLE top, DILUTE for use, (age 15 y+), IM, 30mcg/0.3mL  04/29/2021     Labs:     Hospital Outpatient Visit on 06/27/2022   Component Date Value Ref Range Status    Sodium 06/27/2022 140  136 - 145 mmol/L Final    Potassium 06/27/2022 4.2  3.5 - 5.1 mmol/L Final    Chloride 06/27/2022 106  97 - 108 mmol/L Final    CO2 06/27/2022 30  21 - 32 mmol/L Final    Anion gap 06/27/2022 4* 5 - 15 mmol/L Final    Glucose 06/27/2022 86  65 - 100 mg/dL Final    BUN 06/27/2022 21* 6 - 20 MG/DL Final    Creatinine 06/27/2022 0.75  0.55 - 1.02 MG/DL Final    BUN/Creatinine ratio 06/27/2022 28* 12 - 20   Final    GFR est AA 06/27/2022 >60  >60 ml/min/1.73m2 Final    GFR est non-AA 06/27/2022 >60  >60 ml/min/1.73m2 Final    Estimated GFR is calculated using the IDMS-traceable Modification of Diet in Renal Disease (MDRD) Study equation, reported for both  Americans (GFRAA) and non- Americans (GFRNA), and normalized to 1.73m2 body surface area. The physician must decide which value applies to the patient.  Calcium 06/27/2022 9.4  8.5 - 10.1 MG/DL Final    WBC 06/27/2022 7.0  3.6 - 11.0 K/uL Final    RBC 06/27/2022 5.16  3.80 - 5.20 M/uL Final    HGB 06/27/2022 14.0  11.5 - 16.0 g/dL Final    HCT 06/27/2022 44.1  35.0 - 47.0 % Final    MCV 06/27/2022 85.5  80.0 - 99.0 FL Final    MCH 06/27/2022 27.1  26.0 - 34.0 PG Final    MCHC 06/27/2022 31.7  30.0 - 36.5 g/dL Final    RDW 06/27/2022 14.4  11.5 - 14.5 % Final    PLATELET 50/99/3283 557  150 - 400 K/uL Final    MPV 06/27/2022 10.8  8.9 - 12.9 FL Final    NRBC 06/27/2022 0.0  0  WBC Final    ABSOLUTE NRBC 06/27/2022 0.00  0.00 - 0.01 K/uL Final    Crossmatch Expiration 06/27/2022 07/11/2022,2359   Final    ABO/Rh(D) 06/27/2022 O POSITIVE   Final    Antibody screen 06/27/2022 NEG   Final    INR 06/27/2022 1.0  0.9 - 1.1   Final    A single therapeutic range for Vit K antagonists may not be optimal for all indications - see June, 2008 issue of Chest, American College of Chest Physicians Evidence-Based Clinical Practice Guidelines, 8th Edition.     Prothrombin time 06/27/2022 10.0  9.0 - 11.1 sec Final    Color 06/27/2022 YELLOW/STRAW    Final    Color Reference Range: Straw, Yellow or Dark Yellow    Appearance 06/27/2022 CLEAR  CLEAR   Final    Specific gravity 06/27/2022 1.024  1.003 - 1.030   Final    pH (UA) 06/27/2022 5.5  5.0 - 8.0   Final    Protein 06/27/2022 Negative  NEG mg/dL Final    Glucose 06/27/2022 Negative  NEG mg/dL Final    Ketone 06/27/2022 Negative  NEG mg/dL Final    Bilirubin 06/27/2022 Negative  NEG   Final    Blood 06/27/2022 Negative  NEG   Final    Urobilinogen 06/27/2022 0.2  0.2 - 1.0 EU/dL Final    Nitrites 06/27/2022 Negative  NEG   Final    Leukocyte Esterase 06/27/2022 Negative  NEG   Final    UA:UC IF INDICATED 06/27/2022 CULTURE NOT INDICATED BY UA RESULT  CNI   Final    WBC 06/27/2022 0-4  0 - 4 /hpf Final    RBC 06/27/2022 0-5  0 - 5 /hpf Final    Epithelial cells 06/27/2022 FEW  FEW /lpf Final    Epithelial cell category consists of squamous cells and /or transitional urothelial cells. Renal tubular cells, if present, are separately identified as such.     Bacteria 06/27/2022 1+* NEG /hpf Final    Hyaline cast 06/27/2022 0-2  0 - 5 /lpf Final    Ventricular Rate 06/27/2022 63  BPM Final    Atrial Rate 06/27/2022 63  BPM Final    P-R Interval 06/27/2022 164  ms Final    QRS Duration 06/27/2022 86  ms Final    Q-T Interval 06/27/2022 414  ms Final    QTC Calculation (Bezet) 06/27/2022 423  ms Final    Calculated R Axis 06/27/2022 20  degrees Final    Calculated T Axis 06/27/2022 0  degrees Final    Diagnosis 06/27/2022    Final                    Value:Normal sinus rhythm  Normal ECG  No previous ECGs available  Confirmed by Rule Stanley M.D., Flash Maza ([id number]) on 6/27/2022 9:29:08 AM      Hemoglobin A1c 06/27/2022 5.6  4.0 - 5.6 % Final    Comment: NEW METHOD  PLEASE NOTE NEW REFERENCE RANGE  (NOTE)  HbA1C Interpretive Ranges  <5.7              Normal  5.7 - 6.4         Consider Prediabetes  >6.5              Consider Diabetes      Est. average glucose 06/27/2022 114  mg/dL Final    Special Requests: 06/27/2022 NO SPECIAL REQUESTS    Final    Culture result: 06/27/2022 MRSA NOT PRESENT. Apparent Staphylococus aureus (not MRSA noted). Final       Skin:     Denies open wounds, cuts, sores, rashes or other areas of concern in PAT assessment.           Brent Hart NP

## 2022-06-29 ENCOUNTER — PATIENT MESSAGE (OUTPATIENT)
Dept: SURGERY | Age: 57
End: 2022-06-29

## 2022-06-29 RX ORDER — MUPIROCIN 20 MG/G
OINTMENT TOPICAL 2 TIMES DAILY
Qty: 22 G | Refills: 0 | Status: SHIPPED | OUTPATIENT
Start: 2022-06-29 | End: 2022-07-04

## 2022-06-29 NOTE — PERIOP NOTES
MyChart message to pt regarding positive nasal cx (MSSA) and need to start Mupirocin ointment BID x 5 days to B nostrils starting today and bathe with CHG soap for 5 days prior to surgery. Allergies and pharmacy of choice reviewed. Rx escribed to pt's pharmacy of choice. PTA medlist updated. Surgeon and PCP notified of positive culture and treatment. PRESCRIPTION:    MUPIROCIN 2% OINTMENT  QUANTITY:  #22 GRAMS  REFILLS: NONE    Apply 0.25 g (small pea-sized amount) to both nostrils twice a day for five days. Dickson Daily NP     Message read by pt on 6/29/22 @ [phone].

## 2022-07-11 ENCOUNTER — ANESTHESIA EVENT (OUTPATIENT)
Dept: SURGERY | Age: 57
End: 2022-07-11
Payer: COMMERCIAL

## 2022-07-12 ENCOUNTER — HOSPITAL ENCOUNTER (OUTPATIENT)
Age: 57
Setting detail: OBSERVATION
Discharge: HOME HEALTH | End: 2022-07-13
Attending: ORTHOPAEDIC SURGERY | Admitting: ORTHOPAEDIC SURGERY
Payer: COMMERCIAL

## 2022-07-12 ENCOUNTER — APPOINTMENT (OUTPATIENT)
Dept: GENERAL RADIOLOGY | Age: 57
End: 2022-07-12
Attending: PHYSICIAN ASSISTANT
Payer: COMMERCIAL

## 2022-07-12 ENCOUNTER — ANESTHESIA (OUTPATIENT)
Dept: SURGERY | Age: 57
End: 2022-07-12
Payer: COMMERCIAL

## 2022-07-12 DIAGNOSIS — Z96.642 S/P TOTAL LEFT HIP ARTHROPLASTY: Primary | ICD-10-CM

## 2022-07-12 DIAGNOSIS — M16.12 PRIMARY OSTEOARTHRITIS OF LEFT HIP: ICD-10-CM

## 2022-07-12 DIAGNOSIS — E66.01 MORBID OBESITY WITH BMI OF 45.0-49.9, ADULT (HCC): ICD-10-CM

## 2022-07-12 LAB
ABO + RH BLD: NORMAL
BLOOD GROUP ANTIBODIES SERPL: NORMAL
GLUCOSE BLD STRIP.AUTO-MCNC: 100 MG/DL (ref 65–117)
SERVICE CMNT-IMP: NORMAL
SPECIMEN EXP DATE BLD: NORMAL

## 2022-07-12 PROCEDURE — 74011000250 HC RX REV CODE- 250: Performed by: NURSE ANESTHETIST, CERTIFIED REGISTERED

## 2022-07-12 PROCEDURE — 27130 TOTAL HIP ARTHROPLASTY: CPT | Performed by: ORTHOPAEDIC SURGERY

## 2022-07-12 PROCEDURE — 74011250636 HC RX REV CODE- 250/636: Performed by: ORTHOPAEDIC SURGERY

## 2022-07-12 PROCEDURE — 97116 GAIT TRAINING THERAPY: CPT

## 2022-07-12 PROCEDURE — 74011000250 HC RX REV CODE- 250: Performed by: PHYSICIAN ASSISTANT

## 2022-07-12 PROCEDURE — 36415 COLL VENOUS BLD VENIPUNCTURE: CPT

## 2022-07-12 PROCEDURE — 73502 X-RAY EXAM HIP UNI 2-3 VIEWS: CPT

## 2022-07-12 PROCEDURE — 77030008684 HC TU ET CUF COVD -B: Performed by: ANESTHESIOLOGY

## 2022-07-12 PROCEDURE — 76060000036 HC ANESTHESIA 2.5 TO 3 HR: Performed by: ORTHOPAEDIC SURGERY

## 2022-07-12 PROCEDURE — 77030038552 HC DRN WND MDII -A: Performed by: ORTHOPAEDIC SURGERY

## 2022-07-12 PROCEDURE — 74011000258 HC RX REV CODE- 258: Performed by: PHYSICIAN ASSISTANT

## 2022-07-12 PROCEDURE — 2709999900 HC NON-CHARGEABLE SUPPLY: Performed by: ORTHOPAEDIC SURGERY

## 2022-07-12 PROCEDURE — 74011250636 HC RX REV CODE- 250/636: Performed by: PHYSICIAN ASSISTANT

## 2022-07-12 PROCEDURE — P9045 ALBUMIN (HUMAN), 5%, 250 ML: HCPCS | Performed by: NURSE ANESTHETIST, CERTIFIED REGISTERED

## 2022-07-12 PROCEDURE — 86900 BLOOD TYPING SEROLOGIC ABO: CPT

## 2022-07-12 PROCEDURE — 77030006822 HC BLD SAW SAG BRSM -B: Performed by: ORTHOPAEDIC SURGERY

## 2022-07-12 PROCEDURE — G0378 HOSPITAL OBSERVATION PER HR: HCPCS

## 2022-07-12 PROCEDURE — 76010000172 HC OR TIME 2.5 TO 3 HR INTENSV-TIER 1: Performed by: ORTHOPAEDIC SURGERY

## 2022-07-12 PROCEDURE — 77030005513 HC CATH URETH FOL11 MDII -B: Performed by: ORTHOPAEDIC SURGERY

## 2022-07-12 PROCEDURE — 74011000250 HC RX REV CODE- 250: Performed by: ORTHOPAEDIC SURGERY

## 2022-07-12 PROCEDURE — C9290 INJ, BUPIVACAINE LIPOSOME: HCPCS | Performed by: ORTHOPAEDIC SURGERY

## 2022-07-12 PROCEDURE — 77030040361 HC SLV COMPR DVT MDII -B

## 2022-07-12 PROCEDURE — 77030019908 HC STETH ESOPH SIMS -A: Performed by: ANESTHESIOLOGY

## 2022-07-12 PROCEDURE — 82962 GLUCOSE BLOOD TEST: CPT

## 2022-07-12 PROCEDURE — 77030008462 HC STPLR SKN PROX J&J -A: Performed by: ORTHOPAEDIC SURGERY

## 2022-07-12 PROCEDURE — 74011250637 HC RX REV CODE- 250/637: Performed by: ANESTHESIOLOGY

## 2022-07-12 PROCEDURE — 77030031139 HC SUT VCRL2 J&J -A: Performed by: ORTHOPAEDIC SURGERY

## 2022-07-12 PROCEDURE — 97161 PT EVAL LOW COMPLEX 20 MIN: CPT

## 2022-07-12 PROCEDURE — 77030018074 HC RTVR SUT ARTH4 S&N -B: Performed by: ORTHOPAEDIC SURGERY

## 2022-07-12 PROCEDURE — 74011250636 HC RX REV CODE- 250/636: Performed by: NURSE ANESTHETIST, CERTIFIED REGISTERED

## 2022-07-12 PROCEDURE — 76210000016 HC OR PH I REC 1 TO 1.5 HR: Performed by: ORTHOPAEDIC SURGERY

## 2022-07-12 PROCEDURE — C1776 JOINT DEVICE (IMPLANTABLE): HCPCS | Performed by: ORTHOPAEDIC SURGERY

## 2022-07-12 PROCEDURE — 77030006783 HC BLD SAW OSC MCRA -A: Performed by: ORTHOPAEDIC SURGERY

## 2022-07-12 PROCEDURE — 77030026438 HC STYL ET INTUB CARD -A: Performed by: ANESTHESIOLOGY

## 2022-07-12 PROCEDURE — 74011250636 HC RX REV CODE- 250/636: Performed by: ANESTHESIOLOGY

## 2022-07-12 PROCEDURE — 74011000258 HC RX REV CODE- 258: Performed by: NURSE ANESTHETIST, CERTIFIED REGISTERED

## 2022-07-12 PROCEDURE — 27130 TOTAL HIP ARTHROPLASTY: CPT | Performed by: PHYSICIAN ASSISTANT

## 2022-07-12 PROCEDURE — 74011250637 HC RX REV CODE- 250/637: Performed by: PHYSICIAN ASSISTANT

## 2022-07-12 PROCEDURE — 74011000258 HC RX REV CODE- 258: Performed by: ORTHOPAEDIC SURGERY

## 2022-07-12 PROCEDURE — 97530 THERAPEUTIC ACTIVITIES: CPT

## 2022-07-12 DEVICE — BIOLOX DELTA CERAMIC FEMORAL HEAD 28MM DIA +5.0 12/14 TAPER
Type: IMPLANTABLE DEVICE | Site: HIP | Status: FUNCTIONAL
Brand: BIOLOX DELTA

## 2022-07-12 DEVICE — PINNACLE CANCELLOUS BONE SCREW 6.5MM X 50MM
Type: IMPLANTABLE DEVICE | Site: HIP | Status: FUNCTIONAL
Brand: PINNACLE

## 2022-07-12 DEVICE — ELIMINATOR H APEX FOR 48-60MM PINN HIP SHELL: Type: IMPLANTABLE DEVICE | Site: HIP | Status: FUNCTIONAL

## 2022-07-12 DEVICE — PINNACLE HIP SOLUTIONS DUAL MOBILITY LINER PINNACLE ACETABULAR SHELL BI-MENTUM PE LINER 54/47 54MM 47/28
Type: IMPLANTABLE DEVICE | Site: HIP | Status: FUNCTIONAL
Brand: PINNACLE HIP SOLUTIONS

## 2022-07-12 DEVICE — HIP H3 TOT ADV DUAL MOB IMPL CAPPED H3: Type: IMPLANTABLE DEVICE | Status: FUNCTIONAL

## 2022-07-12 DEVICE — PINNACLE GRIPTION ACETABULAR SHELL SECTOR 54MM OD
Type: IMPLANTABLE DEVICE | Site: HIP | Status: FUNCTIONAL
Brand: PINNACLE GRIPTION

## 2022-07-12 DEVICE — SUMMIT FEMORAL STEM 12/14 TAPER TAPER ED W/POROCOAT SIZE 4 STD 140MM
Type: IMPLANTABLE DEVICE | Site: HIP | Status: FUNCTIONAL
Brand: SUMMIT POROCOAT

## 2022-07-12 DEVICE — BI MENTUM PFR PE LINER 47MM28MM: Type: IMPLANTABLE DEVICE | Site: HIP | Status: FUNCTIONAL

## 2022-07-12 DEVICE — PINNACLE CANCELLOUS BONE SCREW 6.5MM X 20MM
Type: IMPLANTABLE DEVICE | Site: HIP | Status: FUNCTIONAL
Brand: PINNACLE

## 2022-07-12 RX ORDER — ROPIVACAINE HYDROCHLORIDE 5 MG/ML
30 INJECTION, SOLUTION EPIDURAL; INFILTRATION; PERINEURAL AS NEEDED
Status: DISCONTINUED | OUTPATIENT
Start: 2022-07-12 | End: 2022-07-12 | Stop reason: HOSPADM

## 2022-07-12 RX ORDER — GLYCOPYRROLATE 0.2 MG/ML
INJECTION INTRAMUSCULAR; INTRAVENOUS AS NEEDED
Status: DISCONTINUED | OUTPATIENT
Start: 2022-07-12 | End: 2022-07-12 | Stop reason: HOSPADM

## 2022-07-12 RX ORDER — DEXMEDETOMIDINE HYDROCHLORIDE 100 UG/ML
INJECTION, SOLUTION INTRAVENOUS AS NEEDED
Status: DISCONTINUED | OUTPATIENT
Start: 2022-07-12 | End: 2022-07-12 | Stop reason: HOSPADM

## 2022-07-12 RX ORDER — PROCHLORPERAZINE EDISYLATE 5 MG/ML
5 INJECTION INTRAMUSCULAR; INTRAVENOUS
Status: DISCONTINUED | OUTPATIENT
Start: 2022-07-12 | End: 2022-07-13 | Stop reason: HOSPADM

## 2022-07-12 RX ORDER — SODIUM CHLORIDE 9 MG/ML
INJECTION, SOLUTION INTRAVENOUS
Status: DISCONTINUED | OUTPATIENT
Start: 2022-07-12 | End: 2022-07-12 | Stop reason: HOSPADM

## 2022-07-12 RX ORDER — PHENYLEPHRINE HCL IN 0.9% NACL 0.4MG/10ML
SYRINGE (ML) INTRAVENOUS AS NEEDED
Status: DISCONTINUED | OUTPATIENT
Start: 2022-07-12 | End: 2022-07-12 | Stop reason: HOSPADM

## 2022-07-12 RX ORDER — KETAMINE HYDROCHLORIDE 10 MG/ML
INJECTION, SOLUTION INTRAMUSCULAR; INTRAVENOUS AS NEEDED
Status: DISCONTINUED | OUTPATIENT
Start: 2022-07-12 | End: 2022-07-12 | Stop reason: HOSPADM

## 2022-07-12 RX ORDER — SODIUM CHLORIDE 0.9 % (FLUSH) 0.9 %
5-40 SYRINGE (ML) INJECTION AS NEEDED
Status: DISCONTINUED | OUTPATIENT
Start: 2022-07-12 | End: 2022-07-12 | Stop reason: HOSPADM

## 2022-07-12 RX ORDER — METOPROLOL SUCCINATE 25 MG/1
50 TABLET, EXTENDED RELEASE ORAL
Status: DISCONTINUED | OUTPATIENT
Start: 2022-07-12 | End: 2022-07-13 | Stop reason: HOSPADM

## 2022-07-12 RX ORDER — FENTANYL CITRATE 50 UG/ML
25 INJECTION, SOLUTION INTRAMUSCULAR; INTRAVENOUS
Status: COMPLETED | OUTPATIENT
Start: 2022-07-12 | End: 2022-07-12

## 2022-07-12 RX ORDER — MIDAZOLAM HYDROCHLORIDE 1 MG/ML
1 INJECTION, SOLUTION INTRAMUSCULAR; INTRAVENOUS AS NEEDED
Status: DISCONTINUED | OUTPATIENT
Start: 2022-07-12 | End: 2022-07-12 | Stop reason: HOSPADM

## 2022-07-12 RX ORDER — MORPHINE SULFATE 2 MG/ML
2 INJECTION, SOLUTION INTRAMUSCULAR; INTRAVENOUS
Status: DISCONTINUED | OUTPATIENT
Start: 2022-07-12 | End: 2022-07-12 | Stop reason: HOSPADM

## 2022-07-12 RX ORDER — PROPOFOL 10 MG/ML
INJECTION, EMULSION INTRAVENOUS AS NEEDED
Status: DISCONTINUED | OUTPATIENT
Start: 2022-07-12 | End: 2022-07-12 | Stop reason: HOSPADM

## 2022-07-12 RX ORDER — FENTANYL CITRATE 50 UG/ML
INJECTION, SOLUTION INTRAMUSCULAR; INTRAVENOUS AS NEEDED
Status: DISCONTINUED | OUTPATIENT
Start: 2022-07-12 | End: 2022-07-12 | Stop reason: HOSPADM

## 2022-07-12 RX ORDER — POLYETHYLENE GLYCOL 3350 17 G/17G
17 POWDER, FOR SOLUTION ORAL DAILY
Status: DISCONTINUED | OUTPATIENT
Start: 2022-07-13 | End: 2022-07-13 | Stop reason: HOSPADM

## 2022-07-12 RX ORDER — HYDROXYZINE HYDROCHLORIDE 10 MG/1
10 TABLET, FILM COATED ORAL
Status: DISCONTINUED | OUTPATIENT
Start: 2022-07-12 | End: 2022-07-13 | Stop reason: HOSPADM

## 2022-07-12 RX ORDER — ACETAMINOPHEN 325 MG/1
650 TABLET ORAL ONCE
Status: COMPLETED | OUTPATIENT
Start: 2022-07-12 | End: 2022-07-12

## 2022-07-12 RX ORDER — DEXAMETHASONE SODIUM PHOSPHATE 4 MG/ML
INJECTION, SOLUTION INTRA-ARTICULAR; INTRALESIONAL; INTRAMUSCULAR; INTRAVENOUS; SOFT TISSUE AS NEEDED
Status: DISCONTINUED | OUTPATIENT
Start: 2022-07-12 | End: 2022-07-12 | Stop reason: HOSPADM

## 2022-07-12 RX ORDER — SODIUM CHLORIDE 9 MG/ML
125 INJECTION, SOLUTION INTRAVENOUS CONTINUOUS
Status: DISPENSED | OUTPATIENT
Start: 2022-07-12 | End: 2022-07-13

## 2022-07-12 RX ORDER — MIDAZOLAM HYDROCHLORIDE 1 MG/ML
1 INJECTION, SOLUTION INTRAMUSCULAR; INTRAVENOUS
Status: DISCONTINUED | OUTPATIENT
Start: 2022-07-12 | End: 2022-07-12 | Stop reason: HOSPADM

## 2022-07-12 RX ORDER — ROCURONIUM BROMIDE 10 MG/ML
INJECTION, SOLUTION INTRAVENOUS AS NEEDED
Status: DISCONTINUED | OUTPATIENT
Start: 2022-07-12 | End: 2022-07-12 | Stop reason: HOSPADM

## 2022-07-12 RX ORDER — ONDANSETRON 2 MG/ML
INJECTION INTRAMUSCULAR; INTRAVENOUS AS NEEDED
Status: DISCONTINUED | OUTPATIENT
Start: 2022-07-12 | End: 2022-07-12 | Stop reason: HOSPADM

## 2022-07-12 RX ORDER — ROPINIROLE 1 MG/1
4 TABLET, FILM COATED ORAL
Status: DISCONTINUED | OUTPATIENT
Start: 2022-07-12 | End: 2022-07-13 | Stop reason: HOSPADM

## 2022-07-12 RX ORDER — OXYCODONE HYDROCHLORIDE 5 MG/1
5 TABLET ORAL
Status: DISCONTINUED | OUTPATIENT
Start: 2022-07-12 | End: 2022-07-13 | Stop reason: HOSPADM

## 2022-07-12 RX ORDER — DEXTROSE, SODIUM CHLORIDE, SODIUM LACTATE, POTASSIUM CHLORIDE, AND CALCIUM CHLORIDE 5; .6; .31; .03; .02 G/100ML; G/100ML; G/100ML; G/100ML; G/100ML
125 INJECTION, SOLUTION INTRAVENOUS CONTINUOUS
Status: DISCONTINUED | OUTPATIENT
Start: 2022-07-12 | End: 2022-07-12 | Stop reason: HOSPADM

## 2022-07-12 RX ORDER — ONDANSETRON 2 MG/ML
4 INJECTION INTRAMUSCULAR; INTRAVENOUS AS NEEDED
Status: DISCONTINUED | OUTPATIENT
Start: 2022-07-12 | End: 2022-07-12 | Stop reason: HOSPADM

## 2022-07-12 RX ORDER — SUCCINYLCHOLINE CHLORIDE 20 MG/ML
INJECTION INTRAMUSCULAR; INTRAVENOUS AS NEEDED
Status: DISCONTINUED | OUTPATIENT
Start: 2022-07-12 | End: 2022-07-12 | Stop reason: HOSPADM

## 2022-07-12 RX ORDER — SODIUM CHLORIDE 0.9 % (FLUSH) 0.9 %
5-40 SYRINGE (ML) INJECTION EVERY 8 HOURS
Status: DISCONTINUED | OUTPATIENT
Start: 2022-07-12 | End: 2022-07-12 | Stop reason: HOSPADM

## 2022-07-12 RX ORDER — SODIUM CHLORIDE, SODIUM LACTATE, POTASSIUM CHLORIDE, CALCIUM CHLORIDE 600; 310; 30; 20 MG/100ML; MG/100ML; MG/100ML; MG/100ML
125 INJECTION, SOLUTION INTRAVENOUS CONTINUOUS
Status: DISCONTINUED | OUTPATIENT
Start: 2022-07-12 | End: 2022-07-12 | Stop reason: HOSPADM

## 2022-07-12 RX ORDER — MIDAZOLAM HYDROCHLORIDE 1 MG/ML
INJECTION, SOLUTION INTRAMUSCULAR; INTRAVENOUS AS NEEDED
Status: DISCONTINUED | OUTPATIENT
Start: 2022-07-12 | End: 2022-07-12 | Stop reason: HOSPADM

## 2022-07-12 RX ORDER — HYDROMORPHONE HYDROCHLORIDE 1 MG/ML
1 INJECTION, SOLUTION INTRAMUSCULAR; INTRAVENOUS; SUBCUTANEOUS
Status: DISCONTINUED | OUTPATIENT
Start: 2022-07-12 | End: 2022-07-13 | Stop reason: HOSPADM

## 2022-07-12 RX ORDER — SODIUM CHLORIDE 0.9 % (FLUSH) 0.9 %
5-40 SYRINGE (ML) INJECTION EVERY 8 HOURS
Status: DISCONTINUED | OUTPATIENT
Start: 2022-07-12 | End: 2022-07-13 | Stop reason: HOSPADM

## 2022-07-12 RX ORDER — OXYCODONE HYDROCHLORIDE 5 MG/1
10 TABLET ORAL
Status: DISCONTINUED | OUTPATIENT
Start: 2022-07-12 | End: 2022-07-13 | Stop reason: HOSPADM

## 2022-07-12 RX ORDER — HYDROMORPHONE HYDROCHLORIDE 2 MG/ML
INJECTION, SOLUTION INTRAMUSCULAR; INTRAVENOUS; SUBCUTANEOUS AS NEEDED
Status: DISCONTINUED | OUTPATIENT
Start: 2022-07-12 | End: 2022-07-12 | Stop reason: HOSPADM

## 2022-07-12 RX ORDER — LIDOCAINE HYDROCHLORIDE 20 MG/ML
INJECTION, SOLUTION EPIDURAL; INFILTRATION; INTRACAUDAL; PERINEURAL AS NEEDED
Status: DISCONTINUED | OUTPATIENT
Start: 2022-07-12 | End: 2022-07-12 | Stop reason: HOSPADM

## 2022-07-12 RX ORDER — ALBUMIN HUMAN 50 G/1000ML
SOLUTION INTRAVENOUS AS NEEDED
Status: DISCONTINUED | OUTPATIENT
Start: 2022-07-12 | End: 2022-07-12 | Stop reason: HOSPADM

## 2022-07-12 RX ORDER — CELECOXIB 50 MG/1
50 CAPSULE ORAL 2 TIMES DAILY
COMMUNITY
End: 2022-07-13

## 2022-07-12 RX ORDER — FACIAL-BODY WIPES
10 EACH TOPICAL DAILY PRN
Status: DISCONTINUED | OUTPATIENT
Start: 2022-07-14 | End: 2022-07-13 | Stop reason: HOSPADM

## 2022-07-12 RX ORDER — AMOXICILLIN 250 MG
1 CAPSULE ORAL 2 TIMES DAILY
Status: DISCONTINUED | OUTPATIENT
Start: 2022-07-12 | End: 2022-07-13 | Stop reason: HOSPADM

## 2022-07-12 RX ORDER — ESCITALOPRAM OXALATE 10 MG/1
20 TABLET ORAL
Status: DISCONTINUED | OUTPATIENT
Start: 2022-07-12 | End: 2022-07-13 | Stop reason: HOSPADM

## 2022-07-12 RX ORDER — DIPHENHYDRAMINE HYDROCHLORIDE 50 MG/ML
12.5 INJECTION, SOLUTION INTRAMUSCULAR; INTRAVENOUS AS NEEDED
Status: DISCONTINUED | OUTPATIENT
Start: 2022-07-12 | End: 2022-07-12 | Stop reason: HOSPADM

## 2022-07-12 RX ORDER — FENTANYL CITRATE 50 UG/ML
50 INJECTION, SOLUTION INTRAMUSCULAR; INTRAVENOUS AS NEEDED
Status: DISCONTINUED | OUTPATIENT
Start: 2022-07-12 | End: 2022-07-12 | Stop reason: HOSPADM

## 2022-07-12 RX ORDER — ONDANSETRON 2 MG/ML
4 INJECTION INTRAMUSCULAR; INTRAVENOUS
Status: DISCONTINUED | OUTPATIENT
Start: 2022-07-12 | End: 2022-07-13 | Stop reason: HOSPADM

## 2022-07-12 RX ORDER — ACETAMINOPHEN 500 MG
1000 TABLET ORAL EVERY 6 HOURS
Status: DISCONTINUED | OUTPATIENT
Start: 2022-07-12 | End: 2022-07-13 | Stop reason: HOSPADM

## 2022-07-12 RX ORDER — SODIUM CHLORIDE 0.9 % (FLUSH) 0.9 %
5-40 SYRINGE (ML) INJECTION AS NEEDED
Status: DISCONTINUED | OUTPATIENT
Start: 2022-07-12 | End: 2022-07-13 | Stop reason: HOSPADM

## 2022-07-12 RX ORDER — NALOXONE HYDROCHLORIDE 0.4 MG/ML
0.4 INJECTION, SOLUTION INTRAMUSCULAR; INTRAVENOUS; SUBCUTANEOUS AS NEEDED
Status: DISCONTINUED | OUTPATIENT
Start: 2022-07-12 | End: 2022-07-13 | Stop reason: HOSPADM

## 2022-07-12 RX ORDER — LIDOCAINE HYDROCHLORIDE 10 MG/ML
0.5 INJECTION, SOLUTION EPIDURAL; INFILTRATION; INTRACAUDAL; PERINEURAL AS NEEDED
Status: DISCONTINUED | OUTPATIENT
Start: 2022-07-12 | End: 2022-07-12 | Stop reason: HOSPADM

## 2022-07-12 RX ORDER — NEOSTIGMINE METHYLSULFATE 1 MG/ML
INJECTION, SOLUTION INTRAVENOUS AS NEEDED
Status: DISCONTINUED | OUTPATIENT
Start: 2022-07-12 | End: 2022-07-12 | Stop reason: HOSPADM

## 2022-07-12 RX ORDER — OXYCODONE HYDROCHLORIDE 5 MG/1
5 TABLET ORAL AS NEEDED
Status: DISCONTINUED | OUTPATIENT
Start: 2022-07-12 | End: 2022-07-12 | Stop reason: HOSPADM

## 2022-07-12 RX ADMIN — LIDOCAINE HYDROCHLORIDE 60 MG: 20 INJECTION, SOLUTION EPIDURAL; INFILTRATION; INTRACAUDAL; PERINEURAL at 09:47

## 2022-07-12 RX ADMIN — OXYCODONE 5 MG: 5 TABLET ORAL at 18:02

## 2022-07-12 RX ADMIN — ALBUMIN (HUMAN) 250 ML: 12.5 INJECTION, SOLUTION INTRAVENOUS at 10:05

## 2022-07-12 RX ADMIN — ROCURONIUM BROMIDE 10 MG: 10 SOLUTION INTRAVENOUS at 10:13

## 2022-07-12 RX ADMIN — ROCURONIUM BROMIDE 20 MG: 10 SOLUTION INTRAVENOUS at 10:58

## 2022-07-12 RX ADMIN — Medication 20 MG: at 09:47

## 2022-07-12 RX ADMIN — FENTANYL CITRATE 50 MCG: 50 INJECTION, SOLUTION INTRAMUSCULAR; INTRAVENOUS at 10:13

## 2022-07-12 RX ADMIN — SODIUM CHLORIDE 125 ML/HR: 9 INJECTION, SOLUTION INTRAVENOUS at 13:00

## 2022-07-12 RX ADMIN — ROCURONIUM BROMIDE 40 MG: 10 SOLUTION INTRAVENOUS at 09:53

## 2022-07-12 RX ADMIN — OXYCODONE 5 MG: 5 TABLET ORAL at 21:24

## 2022-07-12 RX ADMIN — MEPERIDINE HYDROCHLORIDE 12.5 MG: 25 INJECTION INTRAMUSCULAR; INTRAVENOUS; SUBCUTANEOUS at 13:17

## 2022-07-12 RX ADMIN — ACETAMINOPHEN 650 MG: 325 TABLET ORAL at 09:32

## 2022-07-12 RX ADMIN — FENTANYL CITRATE 25 MCG: 0.05 INJECTION, SOLUTION INTRAMUSCULAR; INTRAVENOUS at 13:35

## 2022-07-12 RX ADMIN — Medication 40 MCG: at 11:09

## 2022-07-12 RX ADMIN — DEXMEDETOMIDINE HYDROCHLORIDE 6 MCG: 100 INJECTION, SOLUTION, CONCENTRATE INTRAVENOUS at 10:58

## 2022-07-12 RX ADMIN — HYDROMORPHONE HYDROCHLORIDE 0.5 MG: 2 INJECTION, SOLUTION INTRAMUSCULAR; INTRAVENOUS; SUBCUTANEOUS at 09:41

## 2022-07-12 RX ADMIN — PROPOFOL 150 MG: 10 INJECTION, EMULSION INTRAVENOUS at 09:47

## 2022-07-12 RX ADMIN — ACETAMINOPHEN 1000 MG: 500 TABLET ORAL at 18:02

## 2022-07-12 RX ADMIN — HYDROMORPHONE HYDROCHLORIDE 0.5 MG: 2 INJECTION, SOLUTION INTRAMUSCULAR; INTRAVENOUS; SUBCUTANEOUS at 12:19

## 2022-07-12 RX ADMIN — DEXMEDETOMIDINE HYDROCHLORIDE 4 MCG: 100 INJECTION, SOLUTION, CONCENTRATE INTRAVENOUS at 10:33

## 2022-07-12 RX ADMIN — HYDROMORPHONE HYDROCHLORIDE 0.5 MG: 2 INJECTION, SOLUTION INTRAMUSCULAR; INTRAVENOUS; SUBCUTANEOUS at 09:47

## 2022-07-12 RX ADMIN — DEXMEDETOMIDINE HYDROCHLORIDE 10 MCG: 100 INJECTION, SOLUTION, CONCENTRATE INTRAVENOUS at 12:13

## 2022-07-12 RX ADMIN — ESCITALOPRAM OXALATE 20 MG: 10 TABLET ORAL at 21:24

## 2022-07-12 RX ADMIN — MORPHINE SULFATE 2 MG: 2 INJECTION, SOLUTION INTRAMUSCULAR; INTRAVENOUS at 14:00

## 2022-07-12 RX ADMIN — MIDAZOLAM 2 MG: 1 INJECTION INTRAMUSCULAR; INTRAVENOUS at 09:41

## 2022-07-12 RX ADMIN — ALBUMIN (HUMAN) 250 ML: 12.5 INJECTION, SOLUTION INTRAVENOUS at 11:09

## 2022-07-12 RX ADMIN — SODIUM CHLORIDE: 900 INJECTION, SOLUTION INTRAVENOUS at 12:13

## 2022-07-12 RX ADMIN — GLYCOPYRROLATE 0.2 MG: 0.2 INJECTION, SOLUTION INTRAMUSCULAR; INTRAVENOUS at 10:20

## 2022-07-12 RX ADMIN — Medication 10 MG: at 10:32

## 2022-07-12 RX ADMIN — ROCURONIUM BROMIDE 10 MG: 10 SOLUTION INTRAVENOUS at 10:27

## 2022-07-12 RX ADMIN — FENTANYL CITRATE 25 MCG: 0.05 INJECTION, SOLUTION INTRAMUSCULAR; INTRAVENOUS at 13:29

## 2022-07-12 RX ADMIN — Medication 20 MG: at 10:12

## 2022-07-12 RX ADMIN — FENTANYL CITRATE 25 MCG: 0.05 INJECTION, SOLUTION INTRAMUSCULAR; INTRAVENOUS at 13:10

## 2022-07-12 RX ADMIN — SUCCINYLCHOLINE CHLORIDE 120 MG: 20 INJECTION, SOLUTION INTRAMUSCULAR; INTRAVENOUS at 09:47

## 2022-07-12 RX ADMIN — OXYCODONE 10 MG: 5 TABLET ORAL at 14:59

## 2022-07-12 RX ADMIN — ROCURONIUM BROMIDE 10 MG: 10 SOLUTION INTRAVENOUS at 09:47

## 2022-07-12 RX ADMIN — GLYCOPYRROLATE 0.6 MG: 0.2 INJECTION, SOLUTION INTRAMUSCULAR; INTRAVENOUS at 12:09

## 2022-07-12 RX ADMIN — SODIUM CHLORIDE, PRESERVATIVE FREE 10 ML: 5 INJECTION INTRAVENOUS at 21:25

## 2022-07-12 RX ADMIN — HYDROMORPHONE HYDROCHLORIDE 0.5 MG: 2 INJECTION, SOLUTION INTRAMUSCULAR; INTRAVENOUS; SUBCUTANEOUS at 10:59

## 2022-07-12 RX ADMIN — CEFAZOLIN 3 G: 10 INJECTION, POWDER, FOR SOLUTION INTRAVENOUS; PARENTERAL at 18:00

## 2022-07-12 RX ADMIN — ONDANSETRON 4 MG: 2 INJECTION INTRAMUSCULAR; INTRAVENOUS at 13:02

## 2022-07-12 RX ADMIN — ROPINIROLE HYDROCHLORIDE 4 MG: 1 TABLET, FILM COATED ORAL at 21:24

## 2022-07-12 RX ADMIN — Medication 40 MCG: at 12:02

## 2022-07-12 RX ADMIN — FENTANYL CITRATE 50 MCG: 50 INJECTION, SOLUTION INTRAMUSCULAR; INTRAVENOUS at 10:26

## 2022-07-12 RX ADMIN — FENTANYL CITRATE 25 MCG: 0.05 INJECTION, SOLUTION INTRAMUSCULAR; INTRAVENOUS at 13:02

## 2022-07-12 RX ADMIN — Medication 3 G: at 10:00

## 2022-07-12 RX ADMIN — SODIUM CHLORIDE, POTASSIUM CHLORIDE, SODIUM LACTATE AND CALCIUM CHLORIDE 125 ML/HR: 600; 310; 30; 20 INJECTION, SOLUTION INTRAVENOUS at 09:22

## 2022-07-12 RX ADMIN — HYDROMORPHONE HYDROCHLORIDE 1 MG: 1 INJECTION, SOLUTION INTRAMUSCULAR; INTRAVENOUS; SUBCUTANEOUS at 15:45

## 2022-07-12 RX ADMIN — TRANEXAMIC ACID 1 G: 100 INJECTION, SOLUTION INTRAVENOUS at 10:01

## 2022-07-12 RX ADMIN — METOPROLOL SUCCINATE 50 MG: 25 TABLET, EXTENDED RELEASE ORAL at 21:24

## 2022-07-12 RX ADMIN — Medication 5 MG: at 12:09

## 2022-07-12 RX ADMIN — DEXAMETHASONE SODIUM PHOSPHATE 8 MG: 4 INJECTION, SOLUTION INTRAMUSCULAR; INTRAVENOUS at 10:00

## 2022-07-12 RX ADMIN — ONDANSETRON HYDROCHLORIDE 4 MG: 2 INJECTION, SOLUTION INTRAMUSCULAR; INTRAVENOUS at 12:03

## 2022-07-12 RX ADMIN — DOCUSATE SODIUM 50MG AND SENNOSIDES 8.6MG 1 TABLET: 8.6; 5 TABLET, FILM COATED ORAL at 18:02

## 2022-07-12 RX ADMIN — SODIUM CHLORIDE 125 ML/HR: 9 INJECTION, SOLUTION INTRAVENOUS at 21:27

## 2022-07-12 NOTE — ANESTHESIA POSTPROCEDURE EVALUATION
Procedure(s):  LEFT TOTAL HIP ARTHROPLASTY. general    Anesthesia Post Evaluation        Patient location during evaluation: PACU  Patient participation: complete - patient participated  Level of consciousness: awake and alert  Pain management: adequate  Airway patency: patent  Anesthetic complications: no  Cardiovascular status: acceptable  Respiratory status: acceptable  Hydration status: acceptable  Comments: I have seen and evaluated the patient and is ready for discharge. Flavia Harrington MD    Post anesthesia nausea and vomiting:  none      INITIAL Post-op Vital signs:   Vitals Value Taken Time   /82 07/12/22 1300   Temp 36.4 °C (97.6 °F) 07/12/22 1234   Pulse 86 07/12/22 1311   Resp 14 07/12/22 1311   SpO2 97 % 07/12/22 1311   Vitals shown include unvalidated device data.

## 2022-07-12 NOTE — PROGRESS NOTES
Occupational Therapy   07/12/22    Orders received, chart review completed. Note patient POD #0 s/p L NEWTON, not indicated as fast track. OT will follow up tomorrow for evaluation. Recommend OOB to chair three times a day for meals, self-completion of ADLs as able and medically stable.      Thank you,   Bettie Prakash, OTD, OTR/L

## 2022-07-12 NOTE — PROGRESS NOTES
Problem: Mobility Impaired (Adult and Pediatric)  Goal: *Acute Goals and Plan of Care (Insert Text)  Description: . FUNCTIONAL STATUS PRIOR TO ADMISSION: Patient was modified independent using a single point cane or 2 canes for functional mobility secondary to pain and weakness. Pt reports 2 falls in past 6 months - legs giving out. Pt sustained injury right foot fifth digit - ? Fx - stubbed little toe against cane. HOME SUPPORT PRIOR TO ADMISSION: The patient lived with  but did not require assist except for shoes/socks.  has been doing all the cleaning, cooking, and shopping for past year. Physical Therapy Goals  Initiated 7/12/2022    1. Patient will move from supine to sit and sit to supine  and scoot up and down in bed with modified independence within 4 days. 2. Patient will perform sit to stand with modified independence within 4 days. 3. Patient will ambulate with modified independence for > 150 feet with the least restrictive device within 4 days. 4. Patient will ascend/descend > 8  stairs with one handrail(s)/cane with supervision/set-up within 4 days. 5. Patient will perform THR home exercise program per protocol with supervision/set-up within 4 days. PHYSICAL THERAPY EVALUATION  Patient: Judi Manning (53 y.o. female)  Date: 7/12/2022  Primary Diagnosis: Primary osteoarthritis of left hip [M16.12]  Procedure(s) (LRB):  LEFT TOTAL HIP ARTHROPLASTY (Left) Day of Surgery   Precautions:   WBAT (posterior hip precautions)  knee immobilizer when in bed for 24 hours    ASSESSMENT  Based on the objective data described below, the patient presents POD# 0 Left THR with pain left hip, decreased AROM/strength and function left leg, decline in functional mobility, decreased activity tolerance and impaired standing balance/gait with RW. Pt mobilized easily and reported decreased pain after ambulation. Pt is motivated to discharge home tomorrow - bedroom on 2nd floor.   Anticipate pt will be able to increase amb distance, advance exercise and perform stairs during am session and be cleared for early discharge from PT standpoint. Current Level of Function Impacting Discharge (mobility/balance): Min to mod assist supine to/from sit; CGA for transfers/amb with RW    Functional Outcome Measure: The patient scored 55/100 on the Barthel Index outcome measure. Other factors to consider for discharge: 14 steps to bedroom,  will be available to assist at discharge     Patient will benefit from skilled therapy intervention to address the above noted impairments. PLAN :  Recommendations and Planned Interventions: bed mobility training, transfer training, gait training, therapeutic exercises, patient and family training/education, and therapeutic activities      Frequency/Duration: Patient will be followed by physical therapy:  twice daily to address goals. Recommendation for discharge: (in order for the patient to meet his/her long term goals)  Physical therapy at least 2 days/week in the home     This discharge recommendation:  Has been made in collaboration with the attending provider and/or case management    IF patient discharges home will need the following DME: patient owns DME required for discharge         SUBJECTIVE:   Patient stated - upon standing  It feels better already .     OBJECTIVE DATA SUMMARY:   HISTORY:    Past Medical History:   Diagnosis Date    Anxiety     Arthritis     Asthma     mild intermittent    BMI 50.0-59.9, adult (HCC)     GERD (gastroesophageal reflux disease)     Hypertension     Insomnia 9/1/2015    Lumbago     Obesity (BMI 35.0-39.9 without comorbidity)     HERMELINDA treated with BiPAP     RLS (restless legs syndrome)      Past Surgical History:   Procedure Laterality Date    HX CERVICAL FUSION  2002     [facility] [address]    HX CHOLECYSTECTOMY  2006    HX COLONOSCOPY      HX LUMBAR DISKECTOMY  2000    DR. Monica Cerna LUMBAR FUSION  2004 DR. Lucy Martin    HX TONSILLECTOMY      CHILD    HX TUBAL LIGATION  1999    HX WISDOM TEETH EXTRACTION  18's    X2    MALINA STEREO VAC  BX BREAST LT 1ST LESION W/CLIP AND SPECIMEN Left ?    benign       Personal factors and/or comorbidities impacting plan of care: as above  Home Situation  Home Environment: Private residence  # Steps to Enter: 4  Rails to Enter: Yes  Hand Rails : Bilateral (too far to reach comfortably)  One/Two Story Residence: Two story  # of Interior Steps: 15  Interior Rails: Right  Living Alone: No  Support Systems: Spouse/Significant Other  Patient Expects to be Discharged to[de-identified] Home with home health  Current DME Used/Available at Home: Kalvin Leventhal, [address], rolling,Cane, straight,Shower chair (Bipap- does not use at home)  Tub or Shower Type: Tub/Shower combination    EXAMINATION/PRESENTATION/DECISION MAKING:   Critical Behavior:  Neurologic State: Alert  Orientation Level: Oriented X4  Cognition: Appropriate decision making,Appropriate safety awareness  Safety/Judgement: Awareness of environment,Good awareness of safety precautions  Hearing: Auditory  Auditory Impairment: None  Range Of Motion:  AROM: Generally decreased, functional (except left leg)                       Strength:    Strength: Generally decreased, functional (except left leg)                    Tone & Sensation:   Tone: Normal              Sensation: Intact               Coordination:  Coordination: Within functional limits  Functional Mobility:  Bed Mobility:     Supine to Sit: Minimum assistance;Assist x1;Additional time; Adaptive equipment;Bed Modified  Sit to Supine: Moderate assistance;Assist x1 (to lift legs up into bed )  Scooting: Stand-by assistance  Transfers:  Sit to Stand: Contact guard assistance  Stand to Sit: Contact guard assistance  Stand Pivot Transfers: Contact guard assistance                    Balance:   Sitting: Intact; Without support  Standing: Impaired; With support  Standing - Static: Good;Constant support  Standing - Dynamic : Fair;Constant support (requires bilateral support of RW at all times)  Ambulation/Gait Training:  Distance (ft): 50 Feet (ft)  Assistive Device: Walker, rolling;Gait belt  Ambulation - Level of Assistance: Contact guard assistance; Adaptive equipment; Additional time;Assist x1        Gait Abnormalities: Decreased step clearance  Right Side Weight Bearing: Full  Left Side Weight Bearing: As tolerated  Base of Support: Center of gravity altered;Shift to right  Stance: Left decreased  Speed/Claudine: Slow  Step Length: Right shortened;Left shortened  Swing Pattern: Left asymmetrical                Therapeutic Exercises/Activity:   Pt instructed and performed ankle pumps and demonstrated proper use of incentive spirometer - instructed to perform 10 reps once hr when awake. Pt instructed and demonstrated quad sets and gluteal sets - to be performed 3 - 5 reps once hr as tolerated when awake. Written instructions provided on pt's communication board. Pt to wear knee immobilizer when in bed x 24 hours. Reviewed posterior hip precautions - no hip flexion  90, no hip adduction or no internal rotation. Functional Measure:  Barthel Index:    Bathin  Bladder: 10  Bowels: 10  Groomin  Dressin  Feeding: 10  Mobility: 0  Stairs: 0  Toilet Use: 5  Transfer (Bed to Chair and Back): 10  Total: 55/100       The Barthel ADL Index: Guidelines  1. The index should be used as a record of what a patient does, not as a record of what a patient could do. 2. The main aim is to establish degree of independence from any help, physical or verbal, however minor and for whatever reason. 3. The need for supervision renders the patient not independent. 4. A patient's performance should be established using the best available evidence. Asking the patient, friends/relatives and nurses are the usual sources, but direct observation and common sense are also important.  However direct testing is not needed. 5. Usually the patient's performance over the preceding 24-48 hours is important, but occasionally longer periods will be relevant. 6. Middle categories imply that the patient supplies over 50 per cent of the effort. 7. Use of aids to be independent is allowed. Score Interpretation (from [address] [facility] [facility] [address])    Independent   60-79 Minimally independent   40-59 Partially dependent   20-39 Very dependent   <20 Totally dependent     -John Lynne., Barthel, DCHAVA. (1965). Functional evaluation: the Barthel Index. [address] ([address] (1997). The Barthel activities of daily living index: self-reporting versus actual performance in the old (> or = 75 years). Journal of [address] [place] [address] 45(7), 14 [facility], JONATAN, Caitlin Mi., Janki Harmon. (1999). Measuring the change in disability after inpatient rehabilitation; comparison of the responsiveness of the Barthel Index and Functional [place] Measure. Journal of Neurology, Neurosurgery, and Psychiatry, 66(4), 353-420. FANNIE Hay, DEBBIE Fisher, & Emilia Qureshi, M.A. (2004) Assessment of post-stroke quality of life in cost-effectiveness studies: The usefulness of the Barthel Index and the EuroQoL-5D.  Quality of Life Research, 15, 427-15           Physical Therapy Evaluation Charge Determination   History Examination Presentation Decision-Making   LOW Complexity : Zero comorbidities / personal factors that will impact the outcome / POC LOW Complexity : 1-2 Standardized tests and measures addressing body structure, function, activity limitation and / or participation in recreation  LOW Complexity : Stable, uncomplicated  LOW Complexity : FOTO score of       Based on the above components, the patient evaluation is determined to be of the following complexity level: LOW     Pain Rating:  Left hip 5/10    Activity Tolerance:   Good - POD# 0 - mobilized easily     After treatment patient left in no apparent distress:   Supine in bed, Call bell within reach, Caregiver / family present, Side rails x 3, and ice applied left hip    COMMUNICATION/EDUCATION:   The patients plan of care was discussed with: Registered nurse. Fall prevention education was provided and the patient/caregiver indicated understanding., Patient/family have participated as able in goal setting and plan of care. , and Patient/family agree to work toward stated goals and plan of care.     Thank you for this referral.  Olvin Leon, PT   Time Calculation: 45 mins

## 2022-07-12 NOTE — H&P
Surgery History and Physical    Subjective:      Charliene Bosworth is a 64 y.o. female who presents for evaluation of left hip OA. Severe pain in hip. Scheduled elective NEWTON    Past Medical History:   Diagnosis Date    Anxiety     Arthritis     Asthma     mild intermittent    BMI 50.0-59.9, adult (HCC)     GERD (gastroesophageal reflux disease)     Hypertension     Insomnia 9/1/2015    Lumbago     Obesity (BMI 35.0-39.9 without comorbidity)     HERMELINDA treated with BiPAP     RLS (restless legs syndrome)      Past Surgical History:   Procedure Laterality Date    HX CERVICAL FUSION  2002     [facility], [address]    HX CHOLECYSTECTOMY  2006    HX COLONOSCOPY      HX LUMBAR DISKECTOMY  2000     [facility], [address]    HX LUMBAR FUSION  2004    DR. CAROLANN ALONSO    HX TONSILLECTOMY      CHILD    HX TUBAL LIGATION  1999    HX WISDOM TEETH EXTRACTION  1980's    X2    MALINA STEREO VAC  BX BREAST LT 1ST LESION W/CLIP AND SPECIMEN Left ?    benign      Family History   Problem Relation Age of Onset    Other Mother         GALLSTONE LODGED BETWEEN BILE DUCT & PANCREAS    Kidney Disease Mother         DUE TO GALLSTONE LODGED BETWEEN BILE DUCT & PANCREAS    COPD Father     Hypertension Father     Arrhythmia Brother         VFIB    Hypertension Brother     OSTEOARTHRITIS Brother     No Known Problems Son     No Known Problems Daughter     Anesth Problems Neg Hx      Social History     Tobacco Use    Smoking status: Never Smoker    Smokeless tobacco: Never Used   Substance Use Topics    Alcohol use: Yes     Alcohol/week: 2.0 standard drinks     Types: 1 Glasses of wine, 1 Shots of liquor per week     Comment: 5 WEEK (1 DRINK NIGHTLY)      Prior to Admission medications    Medication Sig Start Date End Date Taking? Authorizing Provider   liraglutide (SAXENDA SC) 18 mg by SubCUTAneous route nightly.   Patient not taking: Reported on 6/27/2022    Provider, Historical   metoprolol succinate (TOPROL-XL) 50 mg XL tablet TAKE 1 TABLET BY MOUTH EVERY DAY  Patient taking differently: Take 50 mg by mouth nightly. TAKE 1 TABLET BY MOUTH EVERY DAY 20   Cherri Mohr MD   rOPINIRole (REQUIP) 4 mg tab TAB TAKE 1 TABLET BY MOUTH EVERY DAY AT NIGHT. Same dose/frequency of prior pcp. Patient taking differently: Take 4 mg by mouth nightly. TAKE 1 TABLET BY MOUTH EVERY DAY AT NIGHT. Same dose/frequency of prior pcp. 20   Cherri Mohr MD   escitalopram oxalate (LEXAPRO) 20 mg tablet TAKE 1 TABLET BY MOUTH EVERY DAY. For anxiety. Patient taking differently: Take 20 mg by mouth nightly. TAKE 1 TABLET BY MOUTH EVERY DAY. For anxiety. 20   Cherri Mohr MD      No Known Allergies    Review of Systems:  A comprehensive review of systems was negative except for that written in the History of Present Illness. Objective:        Patient Vitals for the past 8 hrs:   BP Temp Pulse Resp SpO2   22 0912 129/83 98.3 °F (36.8 °C) 64 14 96 %       Temp (24hrs), Av.3 °F (36.8 °C), Min:98.3 °F (36.8 °C), Max:98.3 °F (36.8 °C)      Physical Exam:  LUNG: clear to auscultation bilaterally, HEART: regular rate and rhythm, ABDOMEN: soft, non-tender. No masses,  no organomegaly, EXTREMITIES:  extremities normal except for left hip pain on rotation. Assessment:     OA left hip. Plan:     Discussed the risk of surgery including infection,  and the risks ofanesthetic. The patient understands the risks; any and all questions were answered to the patient's satisfaction. Left NEWTON.

## 2022-07-12 NOTE — PROGRESS NOTES
Primary Nurse Caity Kyle RN and Gail Canas LPN performed a dual skin assessment on this patient No impairment noted  Davie score is 21

## 2022-07-12 NOTE — PERIOP NOTES
Patient: Jared Forbes MRN: [id number]  SSN: [id number]   YOB: 1965  Age: 64 y.o. Sex: female     Patient is status post Procedure(s):  LEFT TOTAL HIP ARTHROPLASTY. Surgeon(s) and Role:     * Azael Jacobs MD - Primary    Local/Dose/Irrigation:  20 ml exparel and 30 ml of 0.5% marcaine given with 30 ml of NS for procedure. Peripheral IV 07/12/22 Right; Lower Arm (Active)   Site Assessment Clean, dry, & intact 07/12/22 0925   Phlebitis Assessment 0 07/12/22 0925   Infiltration Assessment 0 07/12/22 0925   Dressing Status Clean, dry, & intact 07/12/22 0925   Dressing Type Transparent;Tape 07/12/22 0925   Hub Color/Line Status Pink; Infusing 07/12/22 0925   Action Taken Blood drawn 07/12/22 0925                           Dressing/Packing:  Incision 07/12/22 Hip Left-Dressing/Treatment: Hydrofiber (07/12/22 1217)    Splint/Cast:  ]    Other:

## 2022-07-12 NOTE — PERIOP NOTES
Called patient's authorized contact [facility]) and provided updates on surgical procedure.

## 2022-07-12 NOTE — OP NOTES
Name: Baylee Daniels  MRN:  [id number]  : 1965  Age:  64 y.o. Surgery Date: 2022      OPERATIVE REPORT - LEFT TOTAL HIP ARTHROPLASTY -   POSTERIOR APPROACH    PREOPERATIVE DIAGNOSIS: Osteoarthritis, left hip. POSTOPERATIVE DIAGNOSIS: Osteoarthritis, left hip. PROCEDURE PERFORMED: Left total hip arthroplasty. SURGEON: Marta Cerda MD    FIRST ASSISTANT:  Jaden Chacon PA-C    ANESTHESIA: General    PRE-OP ANTIBIOTIC: Ancef 3g    COMPLICATIONS: None. ESTIMATED BLOOD LOSS: 300 mL. SPECIMENS REMOVED: None. COMPONENTS IMPLANTED:   Implant Name Type Inv.  Item Serial No.  Lot No. LRB No. Used Action   ELIMINATOR H APEX FOR 48-60MM PINN HIP SHELL - SNA  ELIMINATOR H APEX FOR 48-60MM PINN HIP SHELL NA [facility] [employer] [facility] [id number] Left 1 Implanted   CUP ACET [id number] HIP GRIPTION MICHELLE CEMENTLESS FIX SECT SER - SNA  CUP ACET [id number] HIP GRIPTION MICHELLE CEMENTLESS FIX SECT SER NA [facility] [employer] [facility] [id number] Left 1 Implanted   SCREW BNE L50MM DIA6.5MM CANC HIP DOME PINN - SNA  SCREW BNE L50MM DIA6.5MM CANC HIP DOME PINN NA [facility] [employer] [facility] [id number] Left 1 Implanted   LINER ACETABULAR DM PINNACLE - SNA  LINER ACETABULAR DM PINNACLE NA [facility] [employer] [facility] [id number] Left 1 Implanted   SCR ACET CANC PINN 6.5X20MM SS --  - SNA  SCR ACET CANC PINN 6.5X20MM SS --  NA [facility] [employer] [facility] [id number] Left 1 Implanted   STEM FEM SZ 4 L140MM 130DEG STD OFFSET CALCAR HIP BILAT TI - SNA  STEM FEM SZ 4 L140MM 130DEG STD OFFSET CALCAR HIP BILAT TI NA [facility] [employer] [facility] [id number] Left 1 Implanted   BI MENTUM PFR PE LINER [id number] - SNA  BI MENTUM PFR PE LINER [id number] NA [facility] [employer] [facility] [id number] Left 1 Implanted   HEAD FEM [id number] +5MM OFFSET 12/14 TAPR HIP CERAMIC BIOLOX - SNA  HEAD FEM [id number] +5MM OFFSET 12/14 TAPR HIP CERAMIC BIOLOX NA [facility] [employer]_ [id number] Left 1 Implanted INDICATIONS: The patient is an 64 yrs female with progressive debilitating left hip and groin pain due to severe osteoarthritis. Symptoms have progressed despite comprehensive conservative treatment and the patient presents for left total hip replacement. Risks, benefits, alternatives of the procedure were reviewed in detail and the patient elects to proceed. The patient understands the increased risk for perioperative medical complications. DESCRIPTION OF PROCEDURE: Anesthetic was initiated. Preoperative dose of IV antibiotic was given. The patient was turned lateral. The left side was confirmed as the operative side, prepped and draped in the usual sterile fashion. Skin was covered with Ioban occlusive dressing. Posterolateral exposure was made through a standard length incision. The hip was exposed. Short rotators were taken down as a sleeve of tissue for repair at the end of the procedure. Femoral neck osteotomy was made after dislocating the hip. Prior to dislocation, the leg length determination was made. The acetabulum was exposed. Soft tissues were removed, progressively reamed the acetabulum to 53 mm, and a 54 mm trial shell was impacted with good press-fit. This was removed and a 54 mm shell was impacted into the acetabulum in 40 degrees of abduction and in anatomic anteversion based on his bony anatomy, 6.5 mm screws were placed. The metal DM   liner was placed. Osteophytes were removed. Femur was positioned and elevated out of the wound. Medullary canal was entered,  lateralized into the greater trochanter and then reamed to a size 4, broached to a size 4, which was rotationally and axially stable. Calcar planed and then trialed, 28 mm, +5 hip ball was chosen for appropriate leg length and tension. The hip was dislocated. The anterior greater trochanter was debulked to enhance flexion, rotation and stability. The trial was removed,the real stem was impacted without issue.  The +5 hip ball was placed. The  hip was lined up and reduced. The deep wound was irrigated with thepulsatile lavage. Short rotators and posterior capsule were closed through drill holes in the greater trochanter with #2 Vicryl sutures in 15 degrees of internal rotation. The deep wound was irrigated again. The fascia of the IT band and gluteus daisy were closed with #2 Vicryl sutures. Skin and subcutaneous were irrigated and closed in standard fashion. A sterile dressing was applied. Prior to closure, soft tissues were infiltrated with local  anesthetic. There were no complications. No specimen was sent. Procedure  was LEFT TOTAL HIP REPLACEMENT - POSTERIOR APPROACH. The patient was taken to the recovery room in stable condition. Jez Adames PA-C was critical throughout the case to assist with positioning, retraction and closure. There were no other available residents or surgical assistants to assist during this procedure.     Vonna Goltz, MD

## 2022-07-12 NOTE — DISCHARGE INSTRUCTIONS
Discharge Instructions Hip Replacement  Dr. Andre Goldman      Patient Name  Lupe Pierre  Date of procedure  7/12/2022    Procedure  Procedure(s):  LEFT TOTAL HIP ARTHROPLASTY  Surgeon  Surgeon(s) and Role:     * Mahi Liang MD - Primary  Date of discharge: No discharge date for patient encounter. PCP: Bryanna Luciano NP    Follow up care   Follow up visit with Dr. Andre Goldman in 3 weeks. Call [phone]  to make an appointment    Activity at home   AVOID sudden and extreme movement of your hip (surgical leg)   Take a short walk every hour; except at night when sleeping   Do your Home Exercise Program 3 times every day    After exercising lie down and elevate your leg on pillows for 15-30 minutes to decrease swelling   Refer to your patient notebook for more information    Bathing and caring for your incision   You may take a shower with your waterproof dressing on your hip.  The waterproof dressing is to stay on your hip for 7 days.  On the 7th day have someone gently peel the dressing off by lifting the edge and stretching it to break the seal.   You may then leave your incision open to air unless you see drainage from your hip. Preventing blood clots   Take Eliquis every day as prescribed.  Call Dr. Andre Goldman if you have side effects of blood thinning medication: bleeding, bruising, upset stomach, or diarrhea.  Call Dr. Andre Goldman for signs of a blood clot in your leg: calf pain, tenderness, redness, swelling of lower leg    Preventing lung congestion   Use your incentive spirometer 4 times a day; do 10 repetitions each time   Remember to keep the small blue ball between the two arrows when taking a slow, deep breath           Pain Management   Get up and walk a short distance to relieve pain and stiffness.  Place ice wrap on your hip except when you are walking.  The gel ice packs should be changed about every 4 hours   Elevate your leg on pillows for 15-30 minutes    Take Tylenol 650mg (take two 325mg tablets) every 6 hours for pain.  If needed, take a narcotic pain pill every 4-6 hours as prescribed.  Take all medications with a small amount of food.  As your pain decreases, take the narcotics less often or take ½ of a pill   Call Dr. Elkin Arreaga if you have side effects from your narcotic pain medication: itching, drowsiness, dizziness, upset stomach, dry mouth, constipation or if you medication is not relieving your pain. Diet after surgery   You may resume your normal diet. Include vegetables, fruit, whole grains, lean meats, and low-fat dairy products. Eat food high in fiber    Drink plenty of fluids, including 8 cups of water daily   Take Senokot-s twice a day to prevent constipation    Avoid after surgery   Do not take any over-the-counter medication for pain except Tylenol   Do not take more than 3000mg (3 grams) of Tylenol in 24 hours.  Do not drink alcoholic beverages   Do not smoke   Do not drive until seen for follow up appointment   Do not place frozen gel pack directly on your skin. It can cause frostbite.  Do not take a tub bath, swim or get in a hot tub for 6 weeks  Prevention of falls and safety at home   Set up an area where you can rest comfortably leaving space around furniture to allow you to walk with your walker   Keep stairs, hallways and bathrooms well lit; especially at night   Arrange for care for your pets   Keep your home free of clutter        Call Dr. Elkin Arreaga at [phone] for:   Pain that is not relieved by pain medication, ice and activity   Side effects of medications   Increased/spread of bruising   Warning signs of infection:  ? persistent fever greater than 100 degrees  ?  shaking or chills  ? increased redness, tenderness, swelling or drainage from incision  ? increased pain during activity or rest   Warning signs of a blood clot in your leg:  ? increased pain in your calf  ? tenderness or redness  ? increased swelling or knee, calf, ankle or foot    Call [phone] after 5pm or on a weekend.  The on call physician will return your phone call      Call your Primary Care Doctor for:    Concerns about your medical conditions such as diabetes, high blood pressure, asthma, congestive heart failure   Blood sugars greater than 180   Persistent headache or dizziness   Coughing or congestion   Constipation or diarrhea   Burning when you go to the bathroom   Abnormal heart rate (fast or slow)      Call 911 and go to the nearest hospital for:    Sudden increased shortness of breath   Sudden onset of chest pain   Difficulty breathing   Localized chest pain with coughing or taking a deep breath

## 2022-07-12 NOTE — PERIOP NOTES
TRANSFER - OUT REPORT:    Verbal report given to GELY Riley(name) on Melody Sanches  being transferred to [facility](unit) for routine post - op       Report consisted of patients Situation, Background, Assessment and   Recommendations(SBAR). Time Pre op antibiotic given:10:00 am Ancef  Anesthesia Stop time: 12:35  Harris Present on Transfer to floor:no  Order for Harris on Chart:no  Discharge Prescriptions with Chart:no    Information from the following report(s) SBAR, Kardex, OR Summary, Procedure Summary, Intake/Output, MAR, Recent Results, Med Rec Status and Cardiac Rhythm SR was reviewed with the receiving nurse. Opportunity for questions and clarification was provided. Is the patient on 02? NO       L/Min        Other     Is the patient on a monitor? NO    Is the nurse transporting with the patient? NO    Surgical Waiting Area notified of patient's transfer from PACU? YES      The following personal items collected during your admission accompanied patient upon transfer:   Dental Appliance: Dental Appliances: None  Vision:    Hearing Aid:    Jewelry:    Clothing: Clothing: With patient  Other Valuables:  Other Valuables: Eyeglasses,With patient  Valuables sent to safe:

## 2022-07-12 NOTE — PROGRESS NOTES
Problem: Patient Education: Go to Patient Education Activity  Goal: Patient/Family Education  Outcome: Progressing Towards Goal     Problem: Patient Education: Go to Patient Education Activity  Goal: Patient/Family Education  Outcome: Progressing Towards Goal     Problem: Hip Replacement: Day of Surgery/Unit  Goal: Off Pathway (Use only if patient is Off Pathway)  Outcome: Progressing Towards Goal  Goal: Activity/Safety  Outcome: Progressing Towards Goal  Note: Patient educated to not get out of bed alone, patient to call for help before getting out of bed, call bell left in patient's hand  Goal: Consults, if ordered  Outcome: Progressing Towards Goal  Goal: Diagnostic Test/Procedures  Outcome: Progressing Towards Goal  Goal: Nutrition/Diet  Outcome: Progressing Towards Goal  Goal: Medications  Outcome: Progressing Towards Goal  Goal: Respiratory  Outcome: Progressing Towards Goal  Note: Patient educated on how to use incentive spirometer and to use it ten times per hour  Goal: Treatments/Interventions/Procedures  Outcome: Progressing Towards Goal  Goal: Psychosocial  Outcome: Progressing Towards Goal  Goal: *Initiate mobility  Outcome: Progressing Towards Goal  Goal: *Optimal pain control at patient's stated goal  Outcome: Progressing Towards Goal  Goal: *Hemodynamically stable  Outcome: Progressing Towards Goal

## 2022-07-12 NOTE — PROGRESS NOTES
Bedside and Verbal shift change report given to RobertRN (oncoming nurse) by Ana Nicole RN (offgoing nurse). Report included the following information SBAR and MAR.

## 2022-07-12 NOTE — ANESTHESIA PREPROCEDURE EVALUATION
Relevant Problems   RESPIRATORY SYSTEM   (+) HERMELINDA treated with BiPAP      NEUROLOGY   (+) WILFREDO (generalized anxiety disorder)      CARDIOVASCULAR   (+) Secondary hypertension       Anesthetic History   No history of anesthetic complications            Review of Systems / Medical History  Patient summary reviewed, nursing notes reviewed and pertinent labs reviewed    Pulmonary  Within defined limits      Sleep apnea    Asthma        Neuro/Psych   Within defined limits           Cardiovascular  Within defined limits  Hypertension                   GI/Hepatic/Renal  Within defined limits   GERD           Endo/Other  Within defined limits      Morbid obesity and arthritis     Other Findings              Physical Exam    Airway  Mallampati: II  TM Distance: > 6 cm  Neck ROM: normal range of motion   Mouth opening: Normal     Cardiovascular  Regular rate and rhythm,  S1 and S2 normal,  no murmur, click, rub, or gallop             Dental  No notable dental hx       Pulmonary  Breath sounds clear to auscultation               Abdominal  GI exam deferred       Other Findings            Anesthetic Plan    ASA: 3  Anesthesia type: general          Induction: Intravenous  Anesthetic plan and risks discussed with: Patient

## 2022-07-13 VITALS
BODY MASS INDEX: 46.71 KG/M2 | SYSTOLIC BLOOD PRESSURE: 101 MMHG | HEIGHT: 64 IN | HEART RATE: 72 BPM | WEIGHT: 273.59 LBS | OXYGEN SATURATION: 93 % | DIASTOLIC BLOOD PRESSURE: 62 MMHG | RESPIRATION RATE: 16 BRPM | TEMPERATURE: 98.1 F

## 2022-07-13 LAB
ANION GAP SERPL CALC-SCNC: 5 MMOL/L (ref 5–15)
BUN SERPL-MCNC: 9 MG/DL (ref 6–20)
BUN/CREAT SERPL: 12 (ref 12–20)
CALCIUM SERPL-MCNC: 8.4 MG/DL (ref 8.5–10.1)
CHLORIDE SERPL-SCNC: 105 MMOL/L (ref 97–108)
CO2 SERPL-SCNC: 28 MMOL/L (ref 21–32)
CREAT SERPL-MCNC: 0.75 MG/DL (ref 0.55–1.02)
GLUCOSE SERPL-MCNC: 117 MG/DL (ref 65–100)
HGB BLD-MCNC: 11.1 G/DL (ref 11.5–16)
POTASSIUM SERPL-SCNC: 3.8 MMOL/L (ref 3.5–5.1)
SODIUM SERPL-SCNC: 138 MMOL/L (ref 136–145)

## 2022-07-13 PROCEDURE — G0378 HOSPITAL OBSERVATION PER HR: HCPCS

## 2022-07-13 PROCEDURE — 97165 OT EVAL LOW COMPLEX 30 MIN: CPT

## 2022-07-13 PROCEDURE — 77030027138 HC INCENT SPIROMETER -A

## 2022-07-13 PROCEDURE — 85018 HEMOGLOBIN: CPT

## 2022-07-13 PROCEDURE — 74011000250 HC RX REV CODE- 250: Performed by: PHYSICIAN ASSISTANT

## 2022-07-13 PROCEDURE — 77030036660

## 2022-07-13 PROCEDURE — 97530 THERAPEUTIC ACTIVITIES: CPT

## 2022-07-13 PROCEDURE — 2709999900 HC NON-CHARGEABLE SUPPLY

## 2022-07-13 PROCEDURE — 97110 THERAPEUTIC EXERCISES: CPT

## 2022-07-13 PROCEDURE — 74011250636 HC RX REV CODE- 250/636: Performed by: PHYSICIAN ASSISTANT

## 2022-07-13 PROCEDURE — 80048 BASIC METABOLIC PNL TOTAL CA: CPT

## 2022-07-13 PROCEDURE — 97535 SELF CARE MNGMENT TRAINING: CPT

## 2022-07-13 PROCEDURE — 74011000258 HC RX REV CODE- 258: Performed by: PHYSICIAN ASSISTANT

## 2022-07-13 PROCEDURE — 74011250637 HC RX REV CODE- 250/637: Performed by: PHYSICIAN ASSISTANT

## 2022-07-13 PROCEDURE — 36415 COLL VENOUS BLD VENIPUNCTURE: CPT

## 2022-07-13 PROCEDURE — 97116 GAIT TRAINING THERAPY: CPT

## 2022-07-13 RX ORDER — OXYCODONE HYDROCHLORIDE 5 MG/1
5 TABLET ORAL
Qty: 42 TABLET | Refills: 0 | Status: SHIPPED | OUTPATIENT
Start: 2022-07-13 | End: 2022-07-20

## 2022-07-13 RX ORDER — AMOXICILLIN 250 MG
1 CAPSULE ORAL 2 TIMES DAILY
Qty: 60 TABLET | Refills: 0 | Status: SHIPPED | OUTPATIENT
Start: 2022-07-13 | End: 2022-08-09 | Stop reason: ALTCHOICE

## 2022-07-13 RX ORDER — POLYETHYLENE GLYCOL 3350 17 G/17G
17 POWDER, FOR SOLUTION ORAL DAILY
Qty: 14 PACKET | Refills: 0 | Status: SHIPPED | OUTPATIENT
Start: 2022-07-14 | End: 2022-07-28

## 2022-07-13 RX ADMIN — SODIUM CHLORIDE, PRESERVATIVE FREE 10 ML: 5 INJECTION INTRAVENOUS at 06:59

## 2022-07-13 RX ADMIN — DOCUSATE SODIUM 50MG AND SENNOSIDES 8.6MG 1 TABLET: 8.6; 5 TABLET, FILM COATED ORAL at 08:32

## 2022-07-13 RX ADMIN — ACETAMINOPHEN 1000 MG: 500 TABLET ORAL at 06:59

## 2022-07-13 RX ADMIN — CEFAZOLIN 3 G: 10 INJECTION, POWDER, FOR SOLUTION INTRAVENOUS; PARENTERAL at 02:13

## 2022-07-13 RX ADMIN — POLYETHYLENE GLYCOL 3350 17 G: 17 POWDER, FOR SOLUTION ORAL at 08:32

## 2022-07-13 RX ADMIN — ACETAMINOPHEN 1000 MG: 500 TABLET ORAL at 11:20

## 2022-07-13 RX ADMIN — OXYCODONE 10 MG: 5 TABLET ORAL at 11:20

## 2022-07-13 RX ADMIN — OXYCODONE 10 MG: 5 TABLET ORAL at 06:59

## 2022-07-13 RX ADMIN — APIXABAN 2.5 MG: 2.5 TABLET, FILM COATED ORAL at 08:32

## 2022-07-13 RX ADMIN — ACETAMINOPHEN 1000 MG: 500 TABLET ORAL at 00:17

## 2022-07-13 RX ADMIN — OXYCODONE 10 MG: 5 TABLET ORAL at 00:17

## 2022-07-13 NOTE — PROGRESS NOTES
Problem: Mobility Impaired (Adult and Pediatric)  Goal: *Acute Goals and Plan of Care (Insert Text)  Description: . FUNCTIONAL STATUS PRIOR TO ADMISSION: Patient was modified independent using a single point cane or 2 canes for functional mobility secondary to pain and weakness. Pt reports 2 falls in past 6 months - legs giving out. Pt sustained injury right foot fifth digit - ? Fx - stubbed little toe against cane. HOME SUPPORT PRIOR TO ADMISSION: The patient lived with  but did not require assist except for shoes/socks.  has been doing all the cleaning, cooking, and shopping for past year. Physical Therapy Goals  Initiated 7/12/2022    1. Patient will move from supine to sit and sit to supine  and scoot up and down in bed with modified independence within 4 days. 2. Patient will perform sit to stand with modified independence within 4 days. 3. Patient will ambulate with modified independence for > 150 feet with the least restrictive device within 4 days. 4. Patient will ascend/descend > 8  stairs with one handrail(s)/cane with supervision/set-up within 4 days. 5. Patient will perform THR home exercise program per protocol with supervision/set-up within 4 days. Outcome: Progressing Towards Goal   PHYSICAL THERAPY TREATMENT  Patient: Fidelia Santos (2 y.o. female)  Date: 7/13/2022  Diagnosis: Primary osteoarthritis of left hip [M16.12] <principal problem not specified>  Procedure(s) (LRB):  LEFT TOTAL HIP ARTHROPLASTY (Left) 1 Day Post-Op  Precautions: WBAT (posterior hip precautions)  Chart, physical therapy assessment, plan of care and goals were reviewed. ASSESSMENT  Patient continues with skilled PT services and is progressing towards goals. Pt instructed in use of gait belt to move left leg in/out of bed. Reviewed post op progression, exercise, use of ice and elevation and progressive walking program.  Patient able to perform stairs with standby guard only.   Pt cleared for discharge. Current Level of Function Impacting Discharge (mobility/balance): Standby guard to min assist for bed mobility (gait belt issued to pt), supervision for amb with RW; CGA for stairs with rail    Other factors to consider for discharge: supportive        PLAN :  Patient continues to benefit from skilled intervention to address the above impairments. Continue treatment per established plan of care. to address goals. Recommendation for discharge: (in order for the patient to meet his/her long term goals)  Physical therapy at least 2 days/week in the home     This discharge recommendation:  Has been made in collaboration with the attending provider and/or case management    IF patient discharges home will need the following DME: patient owns DME required for discharge       SUBJECTIVE:   Patient stated I think I'm ready to go home.     OBJECTIVE DATA SUMMARY:   Critical Behavior:  Neurologic State: Alert  Orientation Level: Oriented X4  Cognition: Appropriate decision making  Safety/Judgement: Awareness of environment,Insight into deficits  Functional Mobility Training:  Bed Mobility:     Supine to Sit: Minimum assistance (physical assistance LLE advancement)  Sit to Supine: Stand-by assistance (use of gait belt)  Scooting: Stand-by assistance        Transfers:  Sit to Stand: Supervision  Stand to Sit: Supervision        Bed to Chair: Stand-by assistance                    Balance:  Sitting: Intact; Without support  Standing: Intact; With support  Standing - Static: Good;Constant support  Standing - Dynamic : Good;Constant support (limited dynamic -  requires bilateral support at all times)  Ambulation/Gait Training:  Distance (ft): 125 Feet (ft)  Assistive Device: Walker, rolling;Gait belt  Ambulation - Level of Assistance: Supervision        Gait Abnormalities: Decreased step clearance  Right Side Weight Bearing: Full  Left Side Weight Bearing: As tolerated  Base of Support: Center of gravity altered;Shift to right  Stance: Left decreased  Speed/Claudine: Slow  Step Length: Right shortened;Left shortened  Swing Pattern: Left asymmetrical        Stairs:  Number of Stairs Trained: 8  Stairs - Level of Assistance: Stand-by assistance   Rail Use: Left  (cane right)    Therapeutic Exercises/Activity :   Pt reporting no difficulty with supine exercise. Reviewed post op progression - increasing frequency and distance of ambulation.  to provide standby guard for stairs. Pain Rating:  Pre-treatment left hip 7/10 - describes as stiffness; following amb including stairs - pain 4-5/10. Activity Tolerance:   Good for POD # 1 - increasing OOB tolerance, able to perform stairs     After treatment patient left in no apparent distress:   Supine in bed, Call bell within reach, Caregiver / family present, Side rails x 3, and ice applied left knee    COMMUNICATION/COLLABORATION:   The patients plan of care was discussed with: Registered nurse.      Sammi Cervantes, PT   Time Calculation: 60 mins

## 2022-07-13 NOTE — PROGRESS NOTES
Physical Therapy 7/13/2022    Chart reviewed, pt seen and able to advance amb distance with RW supervision and perform 8 stairs with rail/cane with standby guard. Pt is cleared for discharge from PT standpoint.     Full note to follow -   Anu Koehler, PT

## 2022-07-13 NOTE — PROGRESS NOTES
Problem: Patient Education: Go to Patient Education Activity  Goal: Patient/Family Education  Outcome: Resolved/Met     Problem: Patient Education: Go to Patient Education Activity  Goal: Patient/Family Education  Outcome: Resolved/Met     Problem: Hip Replacement: Day of Surgery/Unit  Goal: Off Pathway (Use only if patient is Off Pathway)  Outcome: Resolved/Met  Goal: Activity/Safety  Outcome: Resolved/Met  Goal: Consults, if ordered  Outcome: Resolved/Met  Goal: Diagnostic Test/Procedures  Outcome: Resolved/Met  Goal: Nutrition/Diet  Outcome: Resolved/Met  Goal: Medications  Outcome: Resolved/Met  Goal: Respiratory  Outcome: Resolved/Met  Goal: Treatments/Interventions/Procedures  Outcome: Resolved/Met  Goal: Psychosocial  Outcome: Resolved/Met  Goal: *Initiate mobility  Outcome: Resolved/Met  Goal: *Optimal pain control at patient's stated goal  Outcome: Resolved/Met  Goal: *Hemodynamically stable  Outcome: Resolved/Met     Problem: Hip Replacement: Post Op Day 1  Goal: Off Pathway (Use only if patient is Off Pathway)  Outcome: Resolved/Met  Goal: Activity/Safety  Outcome: Resolved/Met  Goal: Diagnostic Test/Procedures  Outcome: Resolved/Met  Goal: Nutrition/Diet  Outcome: Resolved/Met  Goal: Medications  Outcome: Resolved/Met  Goal: Respiratory  Outcome: Resolved/Met  Goal: Treatments/Interventions/Procedures  Outcome: Resolved/Met  Goal: Psychosocial  Outcome: Resolved/Met  Goal: Discharge Planning  Outcome: Resolved/Met  Goal: *Demonstrates progressive activity  Outcome: Resolved/Met  Goal: *Optimal pain control at patient's stated goal  Outcome: Resolved/Met  Goal: *Hemodynamically stable  Outcome: Resolved/Met  Goal: *Discharge plan identified  Outcome: Resolved/Met

## 2022-07-13 NOTE — PROGRESS NOTES
Renita Ennis provided to patient/representative with verbal explanation of the notice. Time allotted for questions regarding the notice. Patient /representative provided a completed copy of the VOON notice. Copy placed on bedside chart.   Ruth Ann Beltran, Care Management Assistant

## 2022-07-13 NOTE — DISCHARGE SUMMARY
Ortho Discharge Summary    Patient ID:  Amanda Edwards  [id number]  female  64 y.o.  1965    Admit date: 2022    Discharge date: 2022    Admitting Physician: Kenya Tavares MD     Consulting Physician(s):   Treatment Team: Attending Provider: Maximus Kaur MD; Primary Nurse: Danny Allen RN; Occupational Therapist: Stephanie Noel OT; Physical Therapist: Louvenia Alpers, PT    Date of Surgery:   2022     Preoperative Diagnosis:  OA LEFT HIP    Postoperative Diagnosis:   OA LEFT HIP    Procedure(s):   LEFT TOTAL HIP ARTHROPLASTY     Anesthesia Type:   General     Surgeon: Maximus Kaur MD                            HPI:  Pt is a 64 y.o. female who has a history of OA LEFT HIP  with pain and limitations of activities of daily living who presents at this time for a left LEFT TOTAL HIP ARTHROPLASTY following the failure of conservative management. PMH:   Past Medical History:   Diagnosis Date    Anxiety     Arthritis     Asthma     mild intermittent    BMI 50.0-59.9, adult (HCC)     GERD (gastroesophageal reflux disease)     Hypertension     Insomnia 2015    Lumbago     Obesity (BMI 35.0-39.9 without comorbidity)     HERMELINDA treated with BiPAP     RLS (restless legs syndrome)        Body mass index is 47.33 kg/m². : A BMI > 30 is classified as obesity and > 40 is classified as morbid obesity. Medications upon admission :   Prior to Admission Medications   Prescriptions Last Dose Informant Patient Reported? Taking?   celecoxib (CeleBREX) 50 mg capsule 2022 at Unknown time  Yes Yes   Sig: Take 50 mg by mouth two (2) times a day. escitalopram oxalate (LEXAPRO) 20 mg tablet 2022 at Unknown time  No Yes   Sig: TAKE 1 TABLET BY MOUTH EVERY DAY. For anxiety. Patient taking differently: Take 20 mg by mouth nightly. TAKE 1 TABLET BY MOUTH EVERY DAY. For anxiety.    liraglutide (SAXENDA SC) 2022 at Unknown time  Yes Yes   Si mg by SubCUTAneous route nightly. metoprolol succinate (TOPROL-XL) 50 mg XL tablet 7/11/2022 at Unknown time  No Yes   Sig: TAKE 1 TABLET BY MOUTH EVERY DAY   Patient taking differently: Take 50 mg by mouth nightly. TAKE 1 TABLET BY MOUTH EVERY DAY   rOPINIRole (REQUIP) 4 mg tab TAB 7/11/2022 at Unknown time  No Yes   Sig: TAKE 1 TABLET BY MOUTH EVERY DAY AT NIGHT. Same dose/frequency of prior pcp. Patient taking differently: Take 4 mg by mouth nightly. TAKE 1 TABLET BY MOUTH EVERY DAY AT NIGHT. Same dose/frequency of prior pcp. Facility-Administered Medications: None        Allergies:  No Known Allergies     Hospital Course: The patient underwent surgery. Complications:  None; patient tolerated the procedure well. Was taken to the PACU in stable condition and then transferred to the ortho floor. Patient mobilized on day of surgery with therapy services. Overnight, pain control achieved using primarily scheduled tylenol, as needed oxycodone. On the morning of POD 1, patient again evaluated by therapy services with clearance for discharge to home. Perioperative Antibiotics:  Ancef     Postoperative Pain Management:  Oxycodone & Tylenol     DVT Prophylaxis: Eliquis  2.5  mg PO BID    Postoperative transfusions:    Number of units banked PRBCs =   none     Post Op complications: none    Hemoglobin at discharge:    Lab Results   Component Value Date/Time    HGB 11.1 (L) 07/13/2022 05:36 AM    INR 1.0 06/27/2022 08:08 AM       Aquacel dressing remained in place - clean, dry and intact. No significant erythema or swelling. Wound appears to be healing without any evidence of infection. Neurovascular exam found to be within normal limits. Physical Therapy started following surgery and participated in bed mobility, transfers and ambulation.         Gait:  Gait  Base of Support: Center of gravity altered,Shift to right  Speed/Claudine: Slow  Step Length: Right shortened,Left shortened  Swing Pattern: Left asymmetrical  Stance: Left decreased  Gait Abnormalities: Decreased step clearance  Ambulation - Level of Assistance: Contact guard assistance,Adaptive equipment,Additional time,Assist x1  Distance (ft): 50 Feet (ft)  Assistive Device: Walker, rolling,Gait belt                   Discharged to: Home. Condition on Discharge:   stable    Discharge instructions:  - Anticoagulate with  Eliquis  2.5 mg PO BID  - Take pain medications as prescribed  - Resume pre hospital diet      - Discharge activity: activity as tolerated  - Ambulate with assistive device as needed. - Weight bearing status as tolerated, posterior precautions. - Wound Care Keep wound clean and dry. See discharge instruction sheet. -DISCHARGE MEDICATION LIST     Current Discharge Medication List      START taking these medications    Details   apixaban (ELIQUIS) 2.5 mg tablet Take 1 Tablet by mouth two (2) times a day for 30 days. Qty: 60 Tablet, Refills: 0  Start date: 7/13/2022, End date: 8/12/2022      oxyCODONE IR (ROXICODONE) 5 mg immediate release tablet Take 1 Tablet by mouth every four (4) hours as needed for Pain for up to 7 days. Max Daily Amount: 30 mg.  Qty: 42 Tablet, Refills: 0  Start date: 7/13/2022, End date: 7/20/2022    Associated Diagnoses: S/P total left hip arthroplasty      polyethylene glycol (MIRALAX) 17 gram packet Take 1 Packet by mouth daily for 14 days. Qty: 14 Packet, Refills: 0  Start date: 7/14/2022, End date: 7/28/2022      senna-docusate (PERICOLACE) 8.6-50 mg per tablet Take 1 Tablet by mouth two (2) times a day for 30 days. Qty: 60 Tablet, Refills: 0  Start date: 7/13/2022, End date: 8/12/2022         CONTINUE these medications which have NOT CHANGED    Details   liraglutide (SAXENDA SC) 18 mg by SubCUTAneous route nightly.       metoprolol succinate (TOPROL-XL) 50 mg XL tablet TAKE 1 TABLET BY MOUTH EVERY DAY  Qty: 90 Tab, Refills: 3      rOPINIRole (REQUIP) 4 mg tab TAB TAKE 1 TABLET BY MOUTH EVERY DAY AT NIGHT. Same dose/frequency of prior pcp. Qty: 90 Tab, Refills: 3      escitalopram oxalate (LEXAPRO) 20 mg tablet TAKE 1 TABLET BY MOUTH EVERY DAY. For anxiety. Qty: 90 Tab, Refills: 3         STOP taking these medications       celecoxib (CeleBREX) 50 mg capsule Comments:   Reason for Stopping:         celecoxib (CELEBREX) 200 mg capsule Comments:   Reason for Stopping:            per medical continuation form      -Follow up in office in 3-4 weeks with Dr. Brianna Bravo.        Signed:  Hollie Isaac NP  Orthopaedic Nurse Practitioner    7/13/2022  11:10 AM

## 2022-07-13 NOTE — PROGRESS NOTES
I have reviewed discharge instructions with the patient and spouse. The patient and spouse verbalized understanding. Patient discharged with all personal belongings.

## 2022-07-13 NOTE — PROGRESS NOTES
Ortho NP Note    POD# 1  s/p LEFT TOTAL HIP ARTHROPLASTY   Pt seen in room    Pt resting in bed. Reports that pain is currently well controlled using scheduled tylenol and as needed oxycodone. Patient reports she has been up to [facility] overnight and ambulated yesterday and states that post op pain is improved over chronic preoperative hip pain. Ate portion of breakfast tray, denies N/V. No CP, no SOB. VSS Afebrile. Visit Vitals  /62 (BP 1 Location: Right upper arm, BP Patient Position: At rest)   Pulse 72   Temp 98.1 °F (36.7 °C)   Resp 16   Ht 5' 3.75\" (1.619 m)   Wt 124.1 kg (273 lb 9.5 oz)   LMP 07/09/2019   SpO2 93%   BMI 47.33 kg/m²       Voiding status: spontaneous void   Output (mL)  Urine Voided: 350 ml (07/13/22 0218)  Last Bowel Movement Date: 07/11/22 (07/13/22 0831)      Labs    Lab Results   Component Value Date/Time    HGB 11.1 (L) 07/13/2022 05:36 AM      Lab Results   Component Value Date/Time    INR 1.0 06/27/2022 08:08 AM      Lab Results   Component Value Date/Time    Sodium 138 07/13/2022 05:36 AM    Potassium 3.8 07/13/2022 05:36 AM    Chloride 105 07/13/2022 05:36 AM    CO2 28 07/13/2022 05:36 AM    Glucose 117 (H) 07/13/2022 05:36 AM    BUN 9 07/13/2022 05:36 AM    Creatinine 0.75 07/13/2022 05:36 AM    Calcium 8.4 (L) 07/13/2022 05:36 AM     Recent Glucose Results:   Lab Results   Component Value Date/Time     (H) 07/13/2022 05:36 AM           Body mass index is 47.33 kg/m². : A BMI > 30 is classified as obesity and > 40 is classified as morbid obesity. Aquacel dressing to left posterolateral hip c.d.i  Cryotherapy in place over incision  Calves soft and supple; No pain with passive stretch  Bilateral LEs warm, dry. 2+ DP pulses. Sensation and motor intact - PF/DF/EHL intact 5/5  Foot Pumps for mechanical DVT proph while in bed     PLAN:  1) PT: BID WBAT, posterior precautions. 2) Anticoagulation:  Eliquis  2.5  mg PO BID for DVT Prophylaxis.   Encouraged early mobilization, bed exercises, and SCD use. 3) Pain - Multimodal approach including cryotherapy, scheduled Tylenol with  PRN  oxycodone    4) Post operative anemia: Hgb at PAT on 6/27: 14.0. EBL: 300 mL. Hgb on POD 1: 11.1. No active bleeding noted, patient asymptomatic. Expected Acute blood loss post-op anemia, continue to monitor  5) Post operative care: Encouraged IS. Continue OBR. Aquacel in place x 7 days unless seal/integrity lost.  Follow up in 3-4 weeks with Dr. Lorena Boland. 6) Readniess for discharge:     [x] Vital Signs stable    [x] + Voiding    [x] Wound intact, drainage minimal    [x] Tolerating PO intake     [] Cleared by PT (OT if applicable)     [] Stair training completed (if applicable)    [] Independent / Contact Guard Assist (household distance)     [] Bed mobility     [] Car transfers     [] ADLs    [x] Adequate pain control on oral medication alone     [facility] family support today pending therapy clearance.       Amadeo Montelongo NP  Available via [employer]

## 2022-07-13 NOTE — PROGRESS NOTES
OCCUPATIONAL THERAPY EVALUATION/DISCHARGE  Patient: Aayush Mckinley (88 y.o. female)  Date: 7/13/2022  Primary Diagnosis: Primary osteoarthritis of left hip [M16.12]  Procedure(s) (LRB):  LEFT TOTAL HIP ARTHROPLASTY (Left) 1 Day Post-Op   Precautions:  WBAT (posterior hip precautions)    ASSESSMENT  Based on the objective data described below, the patient presents POD#1 L [place] with anticipated deficits: ROM, strength, and impaired standing balance. Patient's independence with ADLs further limited by posterior hip precautions; patient educated and able to correctly recall 3/3 precautions. Patient demonstrating good carry over of precautions to completion of ADLs and functional mobility. Patient with assistance from  with lower body ADLs PRN. Given this, patient does not require continued OT services at this time. Current Level of Function (ADLs/self-care): lower body ADLs min A; upper body ADLs setup    Functional Outcome Measure: The patient scored 60/100 on the Barthel Index outcome measure which is indicative of Minimally independent in basic self-care. Other factors to consider for discharge: lives with      PLAN :  Recommend with staff: OOB 3x/day for meals    Recommendation for discharge: (in order for the patient to meet his/her long term goals)  No skilled occupational therapy/ follow up rehabilitation needs identified at this time. This discharge recommendation:  Has been made in collaboration with the attending provider and/or case management    IF patient discharges home will need the following DME: tub bench       SUBJECTIVE:   Patient stated It feels so good to stand.     OBJECTIVE DATA SUMMARY:   HISTORY:   Past Medical History:   Diagnosis Date    Anxiety     Arthritis     Asthma     mild intermittent    BMI 50.0-59.9, adult ([address]     GERD (gastroesophageal reflux disease)     Hypertension     Insomnia 9/1/2015    Lumbago     Obesity (BMI 35.0-39.9 without comorbidity)  HERMELINDA treated with BiPAP     RLS (restless legs syndrome)      Past Surgical History:   Procedure Laterality Date    HX CERVICAL FUSION  2002     [facility] [place], [address]    HX CHOLECYSTECTOMY  2006    HX COLONOSCOPY      HX LUMBAR DISKECTOMY  2000     [facility], [address]    HX LUMBAR FUSION  2004     [facility], [address]    HX TONSILLECTOMY      CHILD    HX TUBAL LIGATION  1999    HX WISDOM TEETH EXTRACTION  18's    X2    MALINA STEREO VAC  BX BREAST LT 1ST LESION W/CLIP AND SPECIMEN Left ?    benign       Prior Level of Function/Environment/Context: Patient previously independent with all basic and instrumental ADLs. Lives with . Expanded or extensive additional review of patient history:   Home Situation  Home Environment: Private residence  # Steps to Enter: 4  Rails to Enter: Yes  Hand Rails : Bilateral (too far to reach comfortably)  One/Two Story Residence: Two story  # of Interior Steps: 15  Interior Rails: Right  Living Alone: No  Support Systems: Spouse/Significant Other  Patient Expects to be Discharged to[de-identified] Home with home health  Current DME Used/Available at Home: Kalvin Leventhal, [address]  [address], rolling,Cane, straight,Shower chair (Bipap- does not use at home)  Tub or Shower Type: Tub/Shower combination      EXAMINATION OF PERFORMANCE DEFICITS:  Cognitive/Behavioral Status:  Neurologic State: Alert  Orientation Level: Oriented X4  Cognition: Appropriate decision making  Perception: Appears intact  Perseveration: No perseveration noted  Safety/Judgement: Awareness of environment; Insight into deficits    Skin: L hip incision intact    Edema: intact    Hearing:   Auditory  Auditory Impairment: None    Vision/Perceptual:            intact                         Range of Motion:  AROM: Generally decreased, functional (except LLE)                         Strength:  Strength: Generally decreased, functional (except LLE)                Coordination:  Coordination: Within functional limits  Fine Motor Skills-Upper: Left Intact; Right Intact    Gross Motor Skills-Upper: Left Intact; Right Intact    Tone & Sensation:  Tone: Normal  Sensation: Intact                      Balance:  Sitting: Intact; Without support  Standing: Impaired; With support  Standing - Static: Good;Constant support  Standing - Dynamic : Constant support;Good; able to don underpants in standing without bilateral support from RW    Functional Mobility and Transfers for ADLs:  Bed Mobility:  Supine to Sit: Minimum assistance (physical assistance LLE advancement; however patient demonstrated understanding of use of non-surgical leg to lift surgical leg during bed mobility)  Sit to Supine:  (remained OOB in chair)    Transfers:  Sit to Stand: Contact guard assistance; verbal cues for LLE advancement and forceful exhalation with transfers for prevention of L hip flexion and pain management. Stand to Sit: Contact guard assistance  Bed to Chair: Contact guard assistance; patient demonstrated good safety awareness with RW transfers and functional mobility    ADL Assessment:  Feeding: Independent    Oral Facial Hygiene/Grooming: Independent    Bathing: Minimum assistance    Type of Bath: Chlorhexidine (CHG)    Upper Body Dressing: Setup    Lower Body Dressing: Minimum assistance    Toileting: Supervision                ADL Intervention and task modifications:     Patient provided education on posterior hip precautions; patient demonstrated understanding through teach back of 3/3 precautions. Patient provided with handout detailing posterior hip precautions and energy conservation strategies for ADL completion. Patient educated on benefits and process of tub bench use; patient indicated she would buy on [employer] today.       Type of Bath: Chlorhexidine (CHG)    Lower Body Bathing  Bathing Assistance: Minimum assistance  Lower Body : Minimum assistance (physical A distal aspect BLE for adherence to posterior hip precautions)  Position Performed: Seated edge of bed    Upper Body Dressing Assistance  Dressing Assistance: Set-up  Pullover Shirt: Set-up   Patient educated on benefits of loose clothing following surgery; patient verbalized understanding. Lower Body Dressing Assistance  Dressing Assistance: Minimum assistance  Underpants: Minimum assistance  Position Performed: Seated edge of bed  Patient educated on use of AE for lower body dressing; patient stating she owns appropriate AE and verbalizes understanding of proper use. Patient further educated on benefit of donning underpants and pants, then standing once to complete LB dressing for energy conservation and pain management. Patient stating  can help with whatever she cannot complete. Cognitive Retraining  Safety/Judgement: Awareness of environment; Insight into deficits      Functional Measure:    Barthel Index:  Bathin  Bladder: 10  Bowels: 10  Groomin  Dressin  Feeding: 10  Mobility: 0  Stairs: 0  Toilet Use: 5  Transfer (Bed to Chair and Back): 10  Total: 60/100      The Barthel ADL Index: Guidelines  1. The index should be used as a record of what a patient does, not as a record of what a patient could do. 2. The main aim is to establish degree of independence from any help, physical or verbal, however minor and for whatever reason. 3. The need for supervision renders the patient not independent. 4. A patient's performance should be established using the best available evidence. Asking the patient, friends/relatives and nurses are the usual sources, but direct observation and common sense are also important. However direct testing is not needed. 5. Usually the patient's performance over the preceding 24-48 hours is important, but occasionally longer periods will be relevant. 6. Middle categories imply that the patient supplies over 50 per cent of the effort. 7. Use of aids to be independent is allowed.     Score Interpretation (from [address] [address])    Independent   60-79 Minimally independent   40-59 Partially dependent   20-39 Very dependent   <20 Totally dependent     -John Lynne, Barthel, D.W. (5696). Functional evaluation: the Barthel Index. [address] ([address] (1997). The Barthel activities of daily living index: self-reporting versus actual performance in the old (> or = 75 years). Journal of [address] [place] [address] 45(7), [address] [facility], JONATAN, Shadi Cope., Maryan Lockwood. (1999). Measuring the change in disability after inpatient rehabilitation; comparison of the responsiveness of the Barthel Index and Functional Graham Measure. Journal of Neurology, Neurosurgery, and Psychiatry, 66(4), 297-948. FANNIE Lord, DEBBIE Fisher, & Purnima Crockett M.A. (2004) Assessment of post-stroke quality of life in cost-effectiveness studies: The usefulness of the Barthel Index and the EuroQoL-5D. Quality of Life Research, 13, 427-43       Pain Rating:  Present LLE, increased with movement    Activity Tolerance:   Good and tolerates ADLs without rest breaks    After treatment patient left in no apparent distress:    Sitting in chair and Call bell within reach    COMMUNICATION/EDUCATION:   The patients plan of care was discussed with: Physical therapist.     Thank you for this referral.  Mario Rossi  Time Calculation: 34 mins  Regarding student involvement in patient care:  A student participated in this treatment session. Per CMS Medicare statements and AOTA guidelines I certify that the following was true:  1. I was present and directly observed the entire session. 2. I made all skilled judgments and clinical decisions regarding care. 3. I am the practitioner responsible for assessment, treatment, and documentation.

## 2022-07-13 NOTE — PROGRESS NOTES
Problem: Patient Education: Go to Patient Education Activity  Goal: Patient/Family Education  Outcome: Progressing Towards Goal     Problem: Patient Education: Go to Patient Education Activity  Goal: Patient/Family Education  Outcome: Progressing Towards Goal     Problem: Hip Replacement: Day of Surgery/Unit  Goal: Off Pathway (Use only if patient is Off Pathway)  Outcome: Progressing Towards Goal  Goal: Activity/Safety  Outcome: Progressing Towards Goal  Goal: Consults, if ordered  Outcome: Progressing Towards Goal  Goal: Diagnostic Test/Procedures  Outcome: Progressing Towards Goal  Goal: Nutrition/Diet  Outcome: Progressing Towards Goal  Goal: Medications  Outcome: Progressing Towards Goal  Goal: Respiratory  Outcome: Progressing Towards Goal  Goal: Treatments/Interventions/Procedures  Outcome: Progressing Towards Goal  Goal: Psychosocial  Outcome: Progressing Towards Goal  Goal: *Initiate mobility  Outcome: Progressing Towards Goal  Goal: *Optimal pain control at patient's stated goal  Outcome: Progressing Towards Goal  Goal: *Hemodynamically stable  Outcome: Progressing Towards Goal     Problem: Hip Replacement: Post Op Day 1  Goal: Off Pathway (Use only if patient is Off Pathway)  Outcome: Progressing Towards Goal  Goal: Activity/Safety  Outcome: Progressing Towards Goal  Goal: Diagnostic Test/Procedures  Outcome: Progressing Towards Goal  Goal: Nutrition/Diet  Outcome: Progressing Towards Goal  Goal: Medications  Outcome: Progressing Towards Goal  Goal: Respiratory  Outcome: Progressing Towards Goal  Goal: Treatments/Interventions/Procedures  Outcome: Progressing Towards Goal  Goal: Psychosocial  Outcome: Progressing Towards Goal  Goal: Discharge Planning  Outcome: Progressing Towards Goal  Goal: *Demonstrates progressive activity  Outcome: Progressing Towards Goal  Goal: *Optimal pain control at patient's stated goal  Outcome: Progressing Towards Goal  Goal: *Hemodynamically stable  Outcome: Progressing Your CT scan and MRI did not show any abnormalities concerning for a stroke today. Please give neurology a call for follow up. There was no infection noted today. Thank you. Towards Goal  Goal: *Discharge plan identified  Outcome: Progressing Towards Goal     Problem: Hip Replacement: Post-Op Day 2  Goal: Off Pathway (Use only if patient is Off Pathway)  Outcome: Progressing Towards Goal  Goal: Activity/Safety  Outcome: Progressing Towards Goal  Goal: Diagnostic Test/Procedures  Outcome: Progressing Towards Goal  Goal: Nutrition/Diet  Outcome: Progressing Towards Goal  Goal: Medications  Outcome: Progressing Towards Goal  Goal: Respiratory  Outcome: Progressing Towards Goal  Goal: Treatments/Interventions/Procedures  Outcome: Progressing Towards Goal  Goal: Psychosocial  Outcome: Progressing Towards Goal  Goal: *Met physical therapy criteria for discharge to the next level of care  Outcome: Progressing Towards Goal  Goal: *Optimal pain control with oral analgesia  Outcome: Progressing Towards Goal  Goal: *Hemodynamically stable  Outcome: Progressing Towards Goal  Goal: *Tolerating diet  Outcome: Progressing Towards Goal  Goal: *Verbalizes understanding of any indicated hip precautions  Outcome: Progressing Towards Goal  Goal: *Patient verbalizes understanding of discharge instructions  Outcome: Progressing Towards Goal

## 2022-07-13 NOTE — PROGRESS NOTES
Transition of Care: Plan for discharge home with  and HH. [facility] has accepted patient. Patient owns rolling walker. Family will transport patient home    The Plan for Transition of Care is related to the following treatment goals: home health    The Patient and/or patient representative was provided with a choice of provider and agrees   with the discharge plan. [x] Yes [] No    Freedom of choice list was provided with basic dialogue that supports the patient's individualized plan of care/goals, treatment preferences and shares the quality data associated with the providers.  [x] Yes [] No      PAVAN Barber/CRM

## 2022-07-22 ENCOUNTER — TELEPHONE (OUTPATIENT)
Dept: ORTHOPEDIC SURGERY | Age: 57
End: 2022-07-22

## 2022-07-22 RX ORDER — CEPHALEXIN 500 MG/1
500 CAPSULE ORAL 4 TIMES DAILY
Qty: 40 CAPSULE | Refills: 0 | Status: SHIPPED | OUTPATIENT
Start: 2022-07-22 | End: 2022-08-09 | Stop reason: ALTCHOICE

## 2022-07-22 NOTE — TELEPHONE ENCOUNTER
Home health nurse called to report patient sent a picture to her this morning of her incision. It looks extremely red, no drainage, no fever, look like cellulitis \"angry looking\" at the top of incision. Home health nurse would like to know if an antibiotic can be prescribed out would you like the patient to come in for a visit to be examined.      Harpal Britt on 7/12

## 2022-07-26 ENCOUNTER — OFFICE VISIT (OUTPATIENT)
Dept: ORTHOPEDIC SURGERY | Age: 57
End: 2022-07-26
Payer: COMMERCIAL

## 2022-07-26 ENCOUNTER — DOCUMENTATION ONLY (OUTPATIENT)
Dept: ORTHOPEDIC SURGERY | Age: 57
End: 2022-07-26

## 2022-07-26 DIAGNOSIS — Z96.642 STATUS POST LEFT HIP REPLACEMENT: Primary | ICD-10-CM

## 2022-07-26 PROCEDURE — 99024 POSTOP FOLLOW-UP VISIT: CPT | Performed by: ORTHOPAEDIC SURGERY

## 2022-07-26 RX ORDER — PEN NEEDLE, DIABETIC 31 GX5/16"
NEEDLE, DISPOSABLE MISCELLANEOUS
COMMUNITY
Start: 2022-06-02 | End: 2022-08-25

## 2022-07-26 NOTE — PROGRESS NOTES
Amanda Edwards (: 1965) is a 64 y.o. female, patient, here for evaluation of the following chief complaint(s): Wound Check ([facility] 2022)       HPI:    Follow-up visit status post left total hip. No Known Allergies    Current Outpatient Medications   Medication Sig    BD Ultra-Fine Short Pen Needle 31 gauge x 5/16\" ndle USE TO INJECT SAXENDA ONCE DAILY    cephALEXin (KEFLEX) 500 mg capsule Take 1 Capsule by mouth four (4) times daily. Indications: an infection of the skin and the tissue below the skin    apixaban (ELIQUIS) 2.5 mg tablet Take 1 Tablet by mouth two (2) times a day for 30 days. polyethylene glycol (MIRALAX) 17 gram packet Take 1 Packet by mouth daily for 14 days. senna-docusate (PERICOLACE) 8.6-50 mg per tablet Take 1 Tablet by mouth two (2) times a day for 30 days. liraglutide (SAXENDA SC) 18 mg by SubCUTAneous route nightly. metoprolol succinate (TOPROL-XL) 50 mg XL tablet TAKE 1 TABLET BY MOUTH EVERY DAY (Patient taking differently: Take 50 mg by mouth nightly. TAKE 1 TABLET BY MOUTH EVERY DAY)    rOPINIRole (REQUIP) 4 mg tab TAB TAKE 1 TABLET BY MOUTH EVERY DAY AT NIGHT. Same dose/frequency of prior pcp. (Patient taking differently: Take 4 mg by mouth nightly. TAKE 1 TABLET BY MOUTH EVERY DAY AT NIGHT. Same dose/frequency of prior pcp.)    escitalopram oxalate (LEXAPRO) 20 mg tablet TAKE 1 TABLET BY MOUTH EVERY DAY. For anxiety. (Patient taking differently: Take 20 mg by mouth nightly. TAKE 1 TABLET BY MOUTH EVERY DAY. For anxiety.)     No current facility-administered medications for this visit.        Past Medical History:   Diagnosis Date    Anxiety     Arthritis     Asthma     mild intermittent    BMI 50.0-59.9, adult (HCC)     GERD (gastroesophageal reflux disease)     Hypertension     Insomnia 2015    Lumbago     Obesity (BMI 35.0-39.9 without comorbidity)     HERMELINDA treated with BiPAP     RLS (restless legs syndrome)         Past Surgical History:   Procedure Laterality Date    HX CERVICAL FUSION  2002     [facility] [place], [address]    HX CHOLECYSTECTOMY  2006    HX COLONOSCOPY      HX LUMBAR DISKECTOMY  2000     [facility], [address]    HX LUMBAR FUSION  2004     [facility], [address]    HX TONSILLECTOMY      CHILD    HX TUBAL LIGATION  1999    HX WISDOM TEETH EXTRACTION  1980's    X2    MALINA STEREO VAC  BX BREAST LT 1ST LESION W/CLIP AND SPECIMEN Left ?    benign       Family History   Problem Relation Age of Onset    Other Mother         GALLSTONE LODGED BETWEEN BILE DUCT & PANCREAS    Kidney Disease Mother         DUE TO GALLSTONE LODGED BETWEEN BILE DUCT & PANCREAS    COPD Father     Hypertension Father     Arrhythmia Brother         VFIB    Hypertension Brother     OSTEOARTHRITIS Brother     No Known Problems Son     No Known Problems Daughter     Anesth Problems Neg Hx         Social History     Socioeconomic History    Marital status:      Spouse name: Not on file    Number of children: Not on file    Years of education: Not on file    Highest education level: Not on file   Occupational History    Not on file   Tobacco Use    Smoking status: Never    Smokeless tobacco: Never   Vaping Use    Vaping Use: Some days    Substances: THC    Devices: Pre-filled or refillable cartridge   Substance and Sexual Activity    Alcohol use:  Yes     Alcohol/week: 2.0 standard drinks     Types: 1 Glasses of wine, 1 Shots of liquor per week     Comment: 5 WEEK (1 DRINK NIGHTLY)    Drug use: Yes     Types: Marijuana    Sexual activity: Not on file   Other Topics Concern    Not on file   Social History Narrative    Not on file     Social Determinants of Health     Financial Resource Strain: Not on file   Food Insecurity: Not on file   Transportation Needs: Not on file   Physical Activity: Not on file   Stress: Not on file   Social Connections: Not on file   Intimate Partner Violence: Not on file   Housing Stability: Not on file       ROS    Positive for: Skin, Musculoskeletal  Last edited by Miguel Mikey on 7/26/2022  1:22 PM.            Vitals:  LMP 07/09/2019    There is no height or weight on file to calculate BMI. PHYSICAL EXAM:  Staple reaction with redness along the suture line. Staples were removed. Steri-Strips applied. IMAGING:  None    ASSESSMENT/PLAN:  1. Status post left hip replacement  Removed all the staples and applied Steri-Strips. Finish out Keflex since she started it. Follow-up in 10 days with Larry Guzmán. An electronic signature was used to authenticate this note.   --Haleigh Meek MD

## 2022-08-03 ENCOUNTER — OFFICE VISIT (OUTPATIENT)
Dept: ORTHOPEDIC SURGERY | Age: 57
End: 2022-08-03
Payer: COMMERCIAL

## 2022-08-03 VITALS — BODY MASS INDEX: 46.61 KG/M2 | WEIGHT: 273 LBS | HEIGHT: 64 IN

## 2022-08-03 DIAGNOSIS — Z96.642 STATUS POST TOTAL REPLACEMENT OF LEFT HIP: Primary | ICD-10-CM

## 2022-08-03 PROCEDURE — 99024 POSTOP FOLLOW-UP VISIT: CPT | Performed by: PHYSICIAN ASSISTANT

## 2022-08-03 RX ORDER — CEPHALEXIN 500 MG/1
500 CAPSULE ORAL 4 TIMES DAILY
Qty: 40 CAPSULE | Refills: 0 | Status: SHIPPED | OUTPATIENT
Start: 2022-08-03 | End: 2022-08-09 | Stop reason: ALTCHOICE

## 2022-08-03 NOTE — PROGRESS NOTES
Manohar Hamilton (: 1965) is a 64 y.o. female, established patient, here for evaluation of the following chief complaint(s):  Surgical Follow-up       SUBJECTIVE/OBJECTIVE:    Manohar Hamilton (: 1965) is a 64 y.o. female. Left Total Hip Arthroplasty - Left 2022     Patient returns for wound check today. Patient had left total hip arthroplasty posterior approach and developed some redness around the wound and some wound drainage. Patient was seen 2 weeks postop by Dr. Jerel Aschoff and was placed on Keflex. Patient states she continues to have slight wound drainage but overall is doing well. States her hip is doing great and she is very pleased with the results of her surgery. Denies fevers or chills. No Known Allergies    Current Outpatient Medications   Medication Sig    cephALEXin (KEFLEX) 500 mg capsule Take 1 Capsule by mouth four (4) times daily. Indications: an infection of the skin and the tissue below the skin    BD Ultra-Fine Short Pen Needle 31 gauge x 5/16\" ndle USE TO INJECT SAXENDA ONCE DAILY    cephALEXin (KEFLEX) 500 mg capsule Take 1 Capsule by mouth four (4) times daily. Indications: an infection of the skin and the tissue below the skin    apixaban (ELIQUIS) 2.5 mg tablet Take 1 Tablet by mouth two (2) times a day for 30 days. senna-docusate (PERICOLACE) 8.6-50 mg per tablet Take 1 Tablet by mouth two (2) times a day for 30 days. liraglutide (SAXENDA SC) 18 mg by SubCUTAneous route nightly. metoprolol succinate (TOPROL-XL) 50 mg XL tablet TAKE 1 TABLET BY MOUTH EVERY DAY (Patient taking differently: Take 50 mg by mouth nightly. TAKE 1 TABLET BY MOUTH EVERY DAY)    rOPINIRole (REQUIP) 4 mg tab TAB TAKE 1 TABLET BY MOUTH EVERY DAY AT NIGHT. Same dose/frequency of prior pcp. (Patient taking differently: Take 4 mg by mouth nightly. TAKE 1 TABLET BY MOUTH EVERY DAY AT NIGHT.  Same dose/frequency of prior pcp.)    escitalopram oxalate (LEXAPRO) 20 mg tablet TAKE 1 TABLET BY MOUTH EVERY DAY. For anxiety. (Patient taking differently: Take 20 mg by mouth nightly. TAKE 1 TABLET BY MOUTH EVERY DAY. For anxiety.)     No current facility-administered medications for this visit. Social History     Socioeconomic History    Marital status:      Spouse name: Not on file    Number of children: Not on file    Years of education: Not on file    Highest education level: Not on file   Occupational History    Not on file   Tobacco Use    Smoking status: Never    Smokeless tobacco: Never   Vaping Use    Vaping Use: Some days    Substances: THC    Devices: Pre-filled or refillable cartridge   Substance and Sexual Activity    Alcohol use: Yes     Alcohol/week: 2.0 standard drinks     Types: 1 Glasses of wine, 1 Shots of liquor per week     Comment: 5 WEEK (1 DRINK NIGHTLY)    Drug use: Yes     Types: Marijuana    Sexual activity: Not on file   Other Topics Concern    Not on file   Social History Narrative    Not on file     Social Determinants of Health     Financial Resource Strain: Not on file   Food Insecurity: Not on file   Transportation Needs: Not on file   Physical Activity: Not on file   Stress: Not on file   Social Connections: Not on file   Intimate Partner Violence: Not on file   Housing Stability: Not on file       Past Surgical History:   Procedure Laterality Date    HX CERVICAL FUSION  2002     [facility], [address]    HX CHOLECYSTECTOMY  2006    HX COLONOSCOPY      HX LUMBAR DISKECTOMY  2000     [facility], [address]    HX LUMBAR FUSION  2004      [facility], [address]    HX TONSILLECTOMY      CHILD    HX TUBAL LIGATION  1999    HX WISDOM TEETH EXTRACTION  18's    X2    MALINA STEREO VAC  BX BREAST LT 1ST LESION W/CLIP AND SPECIMEN Left ?    benign       Family History   Problem Relation Age of Onset    Other Mother         GALLSTONE LODGED BETWEEN BILE DUCT & PANCREAS    Kidney Disease Mother         DUE TO GALLSTONE LODGED BETWEEN BILE DUCT & PANCREAS    COPD Father     Hypertension Father     Arrhythmia Brother         VFIB    Hypertension Brother     OSTEOARTHRITIS Brother     No Known Problems Son     No Known Problems Daughter     Anesth Problems Neg Hx         OB History    No obstetric history on file. REVIEW OF SYSTEMS:    Patient denies any recent fever, chills, nausea, vomiting, chest pain, or shortness of breath. Vitals:    Ht 5' 3.75\" (1.619 m)   Wt 273 lb (123.8 kg)   LMP 07/09/2019   BMI 47.23 kg/m²    Body mass index is 47.23 kg/m². PHYSICAL EXAM:    The patient is alert and oriented x3 and in no acute distress. The postoperative wound is well-healed with the exception of the proximal incision 1 cm area and then mid incision 1 cm area where there is fibrinous exudate. The erythema has subsided significantly as compared to a photo she shared from 1 week ago. There is scant drainage if any. There is pain free ROM of the operative hip. There is no calf tenderness to palpation. Distal motor and sensation is intact. IMAGING:    Results from Appointment encounter on 08/03/22    XR HIP LT W OR WO PELV 2-3 VWS    Narrative  AP pelvis, AP and frog lateral digital view radiographs of the left hip were obtained in the office today and reviewed with the patient and demonstrate anatomic alignment of components with no evidence of hardware loosening or subsidence. Orders Placed This Encounter    XR HIP LT W OR WO PELV 2-3 VWS     Standing Status:   Future     Number of Occurrences:   1     Standing Expiration Date:   8/4/2023    cephALEXin (KEFLEX) 500 mg capsule     Sig: Take 1 Capsule by mouth four (4) times daily. Indications: an infection of the skin and the tissue below the skin     Dispense:  40 Capsule     Refill:  0          ASSESSMENT/PLAN:      1.  Status post total replacement of left hip  -     XR HIP LT W OR WO PELV 2-3 VWS; Future        Below is the assessment and plan developed based on review of pertinent history, physical exam, labs, studies, and medications. Have discussed radiographic findings and answered all patient questions to her satisfaction. The patient is to continue DVT prophylaxis for one more week, then may discontinue. Refill the patient's Keflex prophylactically. This does not look like an infected wound but rather delayed wound healing. Have instructed the patient and wound cleansing. The patient was asked to follow up in 1 week to reevaluate the wound, sooner should she develop any's or symptoms of infection, which were discussed today. The patient should remain out of work until her follow-up appointment. The patient was asked to contact the office with any questions or concerns. The patient understands and agrees to the treatment plan as outlined above. Return in about 1 week (around 8/10/2022) for wound check. Dr. Washington Marin was available for immediate consultation as needed. An electronic signature was used to authenticate this note.   -- Roro Sims PA-C

## 2022-08-09 ENCOUNTER — OFFICE VISIT (OUTPATIENT)
Dept: ORTHOPEDIC SURGERY | Age: 57
End: 2022-08-09
Payer: COMMERCIAL

## 2022-08-09 VITALS — HEIGHT: 64 IN | WEIGHT: 273 LBS | BODY MASS INDEX: 46.61 KG/M2

## 2022-08-09 DIAGNOSIS — Z96.642 STATUS POST TOTAL REPLACEMENT OF LEFT HIP: Primary | ICD-10-CM

## 2022-08-09 PROCEDURE — 99024 POSTOP FOLLOW-UP VISIT: CPT | Performed by: PHYSICIAN ASSISTANT

## 2022-08-09 NOTE — PROGRESS NOTES
Antoine Lopez (: 1965) is a 64 y.o. female, established patient, here for evaluation of the following chief complaint(s):  Surgical Follow-up       SUBJECTIVE/OBJECTIVE:    Antoine Lopez (: 1965) is a 64 y.o. female. Left Total Hip Arthroplasty - Left 2022     The patient is very pleased with the results of her surgery and the progress which she has made to date. Patient returns today for wound check. She denies fevers or chills. States she is doing well with minimal, if any hip discomfort. No Known Allergies    Current Outpatient Medications   Medication Sig    BD Ultra-Fine Short Pen Needle 31 gauge x \" ndle USE TO INJECT SAXENDA ONCE DAILY    apixaban (ELIQUIS) 2.5 mg tablet Take 1 Tablet by mouth two (2) times a day for 30 days. liraglutide (SAXENDA SC) 18 mg by SubCUTAneous route nightly. metoprolol succinate (TOPROL-XL) 50 mg XL tablet TAKE 1 TABLET BY MOUTH EVERY DAY (Patient taking differently: Take 50 mg by mouth nightly. TAKE 1 TABLET BY MOUTH EVERY DAY)    rOPINIRole (REQUIP) 4 mg tab TAB TAKE 1 TABLET BY MOUTH EVERY DAY AT NIGHT. Same dose/frequency of prior pcp. (Patient taking differently: Take 4 mg by mouth nightly. TAKE 1 TABLET BY MOUTH EVERY DAY AT NIGHT. Same dose/frequency of prior pcp.)    escitalopram oxalate (LEXAPRO) 20 mg tablet TAKE 1 TABLET BY MOUTH EVERY DAY. For anxiety. (Patient taking differently: Take 20 mg by mouth nightly. TAKE 1 TABLET BY MOUTH EVERY DAY. For anxiety.)     No current facility-administered medications for this visit.        Social History     Socioeconomic History    Marital status:      Spouse name: Not on file    Number of children: Not on file    Years of education: Not on file    Highest education level: Not on file   Occupational History    Not on file   Tobacco Use    Smoking status: Never    Smokeless tobacco: Never   Vaping Use    Vaping Use: Some days    Substances: THC    Devices: Pre-filled or refillable cartridge   Substance and Sexual Activity    Alcohol use: Yes     Alcohol/week: 2.0 standard drinks     Types: 1 Glasses of wine, 1 Shots of liquor per week     Comment: 5 WEEK (1 DRINK NIGHTLY)    Drug use: Yes     Types: Marijuana    Sexual activity: Not on file   Other Topics Concern    Not on file   Social History Narrative    Not on file     Social Determinants of Health     Financial Resource Strain: Not on file   Food Insecurity: Not on file   Transportation Needs: Not on file   Physical Activity: Not on file   Stress: Not on file   Social Connections: Not on file   Intimate Partner Violence: Not on file   Housing Stability: Not on file       Past Surgical History:   Procedure Laterality Date    HX CERVICAL FUSION  2002     [facility] [place], [address]    HX CHOLECYSTECTOMY  2006    HX COLONOSCOPY      HX LUMBAR DISKECTOMY  2000     [facility], [address]    HX LUMBAR FUSION  2004     [facility], [address]    HX TONSILLECTOMY      CHILD    HX TUBAL LIGATION  1999    HX WISDOM TEETH EXTRACTION  18's    X2    MALINA STEREO VAC  BX BREAST LT 1ST LESION W/CLIP AND SPECIMEN Left ?    benign       Family History   Problem Relation Age of Onset    Other Mother         GALLSTONE LODGED BETWEEN BILE DUCT & PANCREAS    Kidney Disease Mother         DUE TO GALLSTONE LODGED BETWEEN BILE DUCT & PANCREAS    COPD Father     Hypertension Father     Arrhythmia Brother         VFIB    Hypertension Brother     OSTEOARTHRITIS Brother     No Known Problems Son     No Known Problems Daughter     Anesth Problems Neg Hx         OB History    No obstetric history on file. REVIEW OF SYSTEMS:    Patient denies any recent fever, chills, nausea, vomiting, chest pain, or shortness of breath. Vitals:    Ht 5' 3.75\" (1.619 m)   Wt 273 lb (123.8 kg)   LMP 07/09/2019   BMI 47.23 kg/m²    Body mass index is 47.23 kg/m². PHYSICAL EXAM:    The patient is alert and oriented x3 and in no acute distress.   Patient ambulates with slight left antalgia without gait aids. The postoperative wound is well-healed. In the middle third of the wound there is slight erythema extending 2 mm out from the wound itself where which is covered with scab. There is 1 small area in the mid incision measuring 3 mm x 2 mm which is filled in with fibrinous exudate. There is no odor or discharge. There is pain free ROM of the operative hip. There is no calf tenderness to palpation. Distal motor and sensation is intact. IMAGING:    Results from Appointment encounter on 08/03/22    XR HIP LT W OR WO PELV 2-3 VWS    Narrative  AP pelvis, AP and frog lateral digital view radiographs of the left hip were obtained in the office today and reviewed with the patient and demonstrate anatomic alignment of components with no evidence of hardware loosening or subsidence. No orders of the defined types were placed in this encounter. ASSESSMENT/PLAN:      1. Status post total replacement of left hip        Below is the assessment and plan developed based on review of pertinent history, physical exam, labs, studies, and medications. Have discussed radiographic findings and answered all patient questions to her satisfaction. The patient may discontinue DVT prophylaxis and they can discontinue antibiotics at this time. The patient will continue home exercises as taught to her by outpatient physical therapy and will continue walking program as tolerated. The patient was asked to follow up in 4 weeks time for reevaluation with Dr Kevin Staples, sooner should she develop any surgery related complications. The patient should remain out of work until her follow-up appointment. The patient was asked to contact the office with any questions or concerns. The patient understands and agrees to the treatment plan as outlined above. Return in about 4 weeks (around 9/6/2022) for post op follow up. Dr. Kevin Staples was available for immediate consultation as needed. An electronic signature was used to authenticate this note.   -- Aubry Cockayne, PA-C

## 2022-08-23 ENCOUNTER — OFFICE VISIT (OUTPATIENT)
Dept: ORTHOPEDIC SURGERY | Age: 57
End: 2022-08-23
Payer: COMMERCIAL

## 2022-08-23 VITALS — WEIGHT: 273 LBS | HEIGHT: 64 IN | BODY MASS INDEX: 46.61 KG/M2

## 2022-08-23 DIAGNOSIS — Z96.642 STATUS POST TOTAL REPLACEMENT OF LEFT HIP: Primary | ICD-10-CM

## 2022-08-23 DIAGNOSIS — T81.31XA DEHISCENCE OF OPERATIVE WOUND, INITIAL ENCOUNTER: ICD-10-CM

## 2022-08-23 PROCEDURE — 99024 POSTOP FOLLOW-UP VISIT: CPT | Performed by: PHYSICIAN ASSISTANT

## 2022-08-23 NOTE — PROGRESS NOTES
Ev Callahan (: 1965) is a 62 y.o. female, established patient, here for evaluation of the following chief complaint(s):  Surgical Follow-up       SUBJECTIVE/OBJECTIVE:    Ev Callahan (: 1965) is a 62 y.o. female. Left Total Hip Arthroplasty - Left 2022     The patient has developed mild wound dehiscence over the past 2 weeks. Patient has been doing hydrogen peroxide to cleanse the wound and keep the wound covered. Patient returns today for wound check. Patient denies fevers, chills or increased pain. Overall, she feels like the hip is doing quite well. No Known Allergies    Current Outpatient Medications   Medication Sig    BD Ultra-Fine Short Pen Needle 31 gauge x \" ndle USE TO INJECT SAXENDA ONCE DAILY    liraglutide (SAXENDA SC) 18 mg by SubCUTAneous route nightly. metoprolol succinate (TOPROL-XL) 50 mg XL tablet TAKE 1 TABLET BY MOUTH EVERY DAY (Patient taking differently: Take 50 mg by mouth nightly. TAKE 1 TABLET BY MOUTH EVERY DAY)    rOPINIRole (REQUIP) 4 mg tab TAB TAKE 1 TABLET BY MOUTH EVERY DAY AT NIGHT. Same dose/frequency of prior pcp. (Patient taking differently: Take 4 mg by mouth nightly. TAKE 1 TABLET BY MOUTH EVERY DAY AT NIGHT. Same dose/frequency of prior pcp.)    escitalopram oxalate (LEXAPRO) 20 mg tablet TAKE 1 TABLET BY MOUTH EVERY DAY. For anxiety. (Patient taking differently: Take 20 mg by mouth nightly. TAKE 1 TABLET BY MOUTH EVERY DAY. For anxiety.)     No current facility-administered medications for this visit.        Social History     Socioeconomic History    Marital status:      Spouse name: Not on file    Number of children: Not on file    Years of education: Not on file    Highest education level: Not on file   Occupational History    Not on file   Tobacco Use    Smoking status: Never    Smokeless tobacco: Never   Vaping Use    Vaping Use: Some days    Substances: THC    Devices: Pre-filled or refillable cartridge   Substance and The patient should be on furosemide 40 mg daily with potassium 20 mEq daily.  He may apply A&E ointment to his perineal rash.  He should go on daily weights.  Check labs next week.   Sexual Activity    Alcohol use: Yes     Alcohol/week: 2.0 standard drinks     Types: 1 Glasses of wine, 1 Shots of liquor per week     Comment: 5 WEEK (1 DRINK NIGHTLY)    Drug use: Yes     Types: Marijuana    Sexual activity: Not on file   Other Topics Concern    Not on file   Social History Narrative    Not on file     Social Determinants of Health     Financial Resource Strain: Not on file   Food Insecurity: Not on file   Transportation Needs: Not on file   Physical Activity: Not on file   Stress: Not on file   Social Connections: Not on file   Intimate Partner Violence: Not on file   Housing Stability: Not on file       Past Surgical History:   Procedure Laterality Date    HX CERVICAL FUSION  2002     [facility] [place], [address]    HX CHOLECYSTECTOMY  2006    HX COLONOSCOPY      HX LUMBAR DISKECTOMY  2000     [facility], [address]    HX LUMBAR FUSION  2004     [facility], [address]    HX TONSILLECTOMY      CHILD    HX TUBAL LIGATION  1999    HX WISDOM TEETH EXTRACTION  18's    X2    MALINA STEREO VAC  BX BREAST LT 1ST LESION W/CLIP AND SPECIMEN Left ?    benign       Family History   Problem Relation Age of Onset    Other Mother         GALLSTONE LODGED BETWEEN BILE DUCT & PANCREAS    Kidney Disease Mother         DUE TO GALLSTONE LODGED BETWEEN BILE DUCT & PANCREAS    COPD Father     Hypertension Father     Arrhythmia Brother         VFIB    Hypertension Brother     OSTEOARTHRITIS Brother     No Known Problems Son     No Known Problems Daughter     Anesth Problems Neg Hx         OB History    No obstetric history on file. REVIEW OF SYSTEMS:    Patient denies any recent fever, chills, nausea, vomiting, chest pain, or shortness of breath. Vitals:    Ht 5' 3.75\" (1.619 m)   Wt 273 lb (123.8 kg)   LMP 07/09/2019   BMI 47.23 kg/m²    Body mass index is 47.23 kg/m². PHYSICAL EXAM:    The patient is alert and oriented x3 and in no acute distress.   The postoperative wound is well-healed with the exception of the midportion of the wound. There is 1 cm x 4 mm area at the midportion of the incision which has dehisced and has filled in with fibrinous exudate. There is pain free ROM of the operative hip. There is no calf tenderness to palpation. Distal motor and sensation is intact. IMAGING:    Results from Appointment encounter on 08/03/22    XR HIP LT W OR WO PELV 2-3 VWS    Narrative  AP pelvis, AP and frog lateral digital view radiographs of the left hip were obtained in the office today and reviewed with the patient and demonstrate anatomic alignment of components with no evidence of hardware loosening or subsidence. No orders of the defined types were placed in this encounter. ASSESSMENT/PLAN:      1. Status post total replacement of left hip  2. Dehiscence of operative wound, initial encounter        Below is the assessment and plan developed based on review of pertinent history, physical exam, labs, studies, and medications. Have discussed radiographic findings and answered all patient questions to her satisfaction. Dr. Jeanine Aj personally examined the wound and agrees that the patient needs to have wound revision due to fat necrosis. There is no obvious evidence of wound infection. Will plan on wound revision of the left total hip wound on Friday, 8/26/2022. The procedure, risks and rehab concerns were discussed at length including potential complications. The patient was asked to contact the office with any questions or concerns. The patient understands and agrees to the treatment plan as outlined above. Return in about 4 weeks (around 9/20/2022) for post op follow up. Dr. Jeanine Aj was available for immediate consultation as needed. An electronic signature was used to authenticate this note.   -- Jez Adames PA-C

## 2022-08-24 ENCOUNTER — ANESTHESIA EVENT (OUTPATIENT)
Dept: SURGERY | Age: 57
End: 2022-08-24
Payer: COMMERCIAL

## 2022-08-25 NOTE — PERIOP NOTES
PAT PHONE INTERVIEW COMPLETE. PT HAD PAT IN June, 2022-ANY CHANGES WERE DOCUMENTED. INSTRUCTED RE: MEDS, CHG, CL AFTER MN.  OPPORTUNITY FOR QUESTIONS GIVEN.

## 2022-08-26 ENCOUNTER — HOSPITAL ENCOUNTER (OUTPATIENT)
Age: 57
Discharge: HOME | End: 2022-08-26
Attending: ORTHOPAEDIC SURGERY | Admitting: ORTHOPAEDIC SURGERY
Payer: COMMERCIAL

## 2022-08-26 ENCOUNTER — ANESTHESIA (OUTPATIENT)
Dept: SURGERY | Age: 57
End: 2022-08-26
Payer: COMMERCIAL

## 2022-08-26 VITALS
DIASTOLIC BLOOD PRESSURE: 78 MMHG | OXYGEN SATURATION: 93 % | SYSTOLIC BLOOD PRESSURE: 130 MMHG | BODY MASS INDEX: 47.8 KG/M2 | HEIGHT: 64 IN | TEMPERATURE: 96.9 F | HEART RATE: 73 BPM | WEIGHT: 280 LBS | RESPIRATION RATE: 14 BRPM

## 2022-08-26 DIAGNOSIS — G89.18 POST-OPERATIVE PAIN: Primary | ICD-10-CM

## 2022-08-26 PROBLEM — I97.89 NECROSIS OF SURGICAL WOUND (HCC): Status: ACTIVE | Noted: 2022-08-26

## 2022-08-26 PROBLEM — I96 NECROSIS OF SURGICAL WOUND (HCC): Status: ACTIVE | Noted: 2022-08-26

## 2022-08-26 LAB
BASOPHILS # BLD: 0.1 K/UL (ref 0–0.1)
BASOPHILS NFR BLD: 1 % (ref 0–1)
DIFFERENTIAL METHOD BLD: ABNORMAL
EOSINOPHIL # BLD: 0.3 K/UL (ref 0–0.4)
EOSINOPHIL NFR BLD: 3 % (ref 0–7)
ERYTHROCYTE [DISTWIDTH] IN BLOOD BY AUTOMATED COUNT: 14.6 % (ref 11.5–14.5)
HCT VFR BLD AUTO: 36.1 % (ref 35–47)
HGB BLD-MCNC: 11.7 G/DL (ref 11.5–16)
IMM GRANULOCYTES # BLD AUTO: 0.1 K/UL (ref 0–0.04)
IMM GRANULOCYTES NFR BLD AUTO: 1 % (ref 0–0.5)
LYMPHOCYTES # BLD: 2.8 K/UL (ref 0.8–3.5)
LYMPHOCYTES NFR BLD: 29 % (ref 12–49)
MCH RBC QN AUTO: 26.5 PG (ref 26–34)
MCHC RBC AUTO-ENTMCNC: 32.4 G/DL (ref 30–36.5)
MCV RBC AUTO: 81.9 FL (ref 80–99)
MONOCYTES # BLD: 0.9 K/UL (ref 0–1)
MONOCYTES NFR BLD: 10 % (ref 5–13)
NEUTS SEG # BLD: 5.5 K/UL (ref 1.8–8)
NEUTS SEG NFR BLD: 56 % (ref 32–75)
NRBC # BLD: 0 K/UL (ref 0–0.01)
NRBC BLD-RTO: 0 PER 100 WBC
PLATELET # BLD AUTO: 431 K/UL (ref 150–400)
PMV BLD AUTO: 9.9 FL (ref 8.9–12.9)
RBC # BLD AUTO: 4.41 M/UL (ref 3.8–5.2)
WBC # BLD AUTO: 9.7 K/UL (ref 3.6–11)

## 2022-08-26 PROCEDURE — 74011250636 HC RX REV CODE- 250/636: Performed by: PHYSICIAN ASSISTANT

## 2022-08-26 PROCEDURE — 74011250636 HC RX REV CODE- 250/636: Performed by: ANESTHESIOLOGY

## 2022-08-26 PROCEDURE — 74011000250 HC RX REV CODE- 250: Performed by: NURSE ANESTHETIST, CERTIFIED REGISTERED

## 2022-08-26 PROCEDURE — 2709999900 HC NON-CHARGEABLE SUPPLY: Performed by: ORTHOPAEDIC SURGERY

## 2022-08-26 PROCEDURE — 76210000006 HC OR PH I REC 0.5 TO 1 HR: Performed by: ORTHOPAEDIC SURGERY

## 2022-08-26 PROCEDURE — 77030002966 HC SUT PDS J&J -A: Performed by: ORTHOPAEDIC SURGERY

## 2022-08-26 PROCEDURE — 77030026438 HC STYL ET INTUB CARD -A: Performed by: ANESTHESIOLOGY

## 2022-08-26 PROCEDURE — 77030002933 HC SUT MCRYL J&J -A: Performed by: ORTHOPAEDIC SURGERY

## 2022-08-26 PROCEDURE — 76010000153 HC OR TIME 1.5 TO 2 HR: Performed by: ORTHOPAEDIC SURGERY

## 2022-08-26 PROCEDURE — 36415 COLL VENOUS BLD VENIPUNCTURE: CPT

## 2022-08-26 PROCEDURE — 76060000034 HC ANESTHESIA 1.5 TO 2 HR: Performed by: ORTHOPAEDIC SURGERY

## 2022-08-26 PROCEDURE — 76210000020 HC REC RM PH II FIRST 0.5 HR: Performed by: ORTHOPAEDIC SURGERY

## 2022-08-26 PROCEDURE — 77030008684 HC TU ET CUF COVD -B: Performed by: ANESTHESIOLOGY

## 2022-08-26 PROCEDURE — 77030002916 HC SUT ETHLN J&J -A: Performed by: ORTHOPAEDIC SURGERY

## 2022-08-26 PROCEDURE — 85025 COMPLETE CBC W/AUTO DIFF WBC: CPT

## 2022-08-26 PROCEDURE — 77030013079 HC BLNKT BAIR HGGR 3M -A: Performed by: ANESTHESIOLOGY

## 2022-08-26 PROCEDURE — 74011250636 HC RX REV CODE- 250/636: Performed by: NURSE ANESTHETIST, CERTIFIED REGISTERED

## 2022-08-26 RX ORDER — MORPHINE SULFATE 2 MG/ML
2 INJECTION, SOLUTION INTRAMUSCULAR; INTRAVENOUS
Status: DISCONTINUED | OUTPATIENT
Start: 2022-08-26 | End: 2022-08-26 | Stop reason: HOSPADM

## 2022-08-26 RX ORDER — SUCCINYLCHOLINE CHLORIDE 20 MG/ML
INJECTION INTRAMUSCULAR; INTRAVENOUS AS NEEDED
Status: DISCONTINUED | OUTPATIENT
Start: 2022-08-26 | End: 2022-08-26 | Stop reason: HOSPADM

## 2022-08-26 RX ORDER — DEXAMETHASONE SODIUM PHOSPHATE 4 MG/ML
INJECTION, SOLUTION INTRA-ARTICULAR; INTRALESIONAL; INTRAMUSCULAR; INTRAVENOUS; SOFT TISSUE AS NEEDED
Status: DISCONTINUED | OUTPATIENT
Start: 2022-08-26 | End: 2022-08-26 | Stop reason: HOSPADM

## 2022-08-26 RX ORDER — PROPOFOL 10 MG/ML
INJECTION, EMULSION INTRAVENOUS AS NEEDED
Status: DISCONTINUED | OUTPATIENT
Start: 2022-08-26 | End: 2022-08-26 | Stop reason: HOSPADM

## 2022-08-26 RX ORDER — ONDANSETRON 2 MG/ML
INJECTION INTRAMUSCULAR; INTRAVENOUS AS NEEDED
Status: DISCONTINUED | OUTPATIENT
Start: 2022-08-26 | End: 2022-08-26 | Stop reason: HOSPADM

## 2022-08-26 RX ORDER — DIPHENHYDRAMINE HYDROCHLORIDE 50 MG/ML
12.5 INJECTION, SOLUTION INTRAMUSCULAR; INTRAVENOUS AS NEEDED
Status: DISCONTINUED | OUTPATIENT
Start: 2022-08-26 | End: 2022-08-26 | Stop reason: HOSPADM

## 2022-08-26 RX ORDER — OXYCODONE HYDROCHLORIDE 5 MG/1
5 TABLET ORAL
Qty: 20 TABLET | Refills: 0 | Status: SHIPPED | OUTPATIENT
Start: 2022-08-26 | End: 2022-09-09 | Stop reason: ALTCHOICE

## 2022-08-26 RX ORDER — MIDAZOLAM HYDROCHLORIDE 1 MG/ML
0.5 INJECTION, SOLUTION INTRAMUSCULAR; INTRAVENOUS
Status: DISCONTINUED | OUTPATIENT
Start: 2022-08-26 | End: 2022-08-26 | Stop reason: HOSPADM

## 2022-08-26 RX ORDER — PHENYLEPHRINE HCL IN 0.9% NACL 0.4MG/10ML
SYRINGE (ML) INTRAVENOUS AS NEEDED
Status: DISCONTINUED | OUTPATIENT
Start: 2022-08-26 | End: 2022-08-26 | Stop reason: HOSPADM

## 2022-08-26 RX ORDER — HYDROCODONE BITARTRATE AND ACETAMINOPHEN 5; 325 MG/1; MG/1
1 TABLET ORAL AS NEEDED
Status: DISCONTINUED | OUTPATIENT
Start: 2022-08-26 | End: 2022-08-26 | Stop reason: HOSPADM

## 2022-08-26 RX ORDER — LIDOCAINE HYDROCHLORIDE 20 MG/ML
INJECTION, SOLUTION EPIDURAL; INFILTRATION; INTRACAUDAL; PERINEURAL AS NEEDED
Status: DISCONTINUED | OUTPATIENT
Start: 2022-08-26 | End: 2022-08-26 | Stop reason: HOSPADM

## 2022-08-26 RX ORDER — HYDROMORPHONE HYDROCHLORIDE 1 MG/ML
0.2 INJECTION, SOLUTION INTRAMUSCULAR; INTRAVENOUS; SUBCUTANEOUS
Status: DISCONTINUED | OUTPATIENT
Start: 2022-08-26 | End: 2022-08-26 | Stop reason: HOSPADM

## 2022-08-26 RX ORDER — MIDAZOLAM HYDROCHLORIDE 1 MG/ML
1 INJECTION, SOLUTION INTRAMUSCULAR; INTRAVENOUS AS NEEDED
Status: DISCONTINUED | OUTPATIENT
Start: 2022-08-26 | End: 2022-08-26 | Stop reason: HOSPADM

## 2022-08-26 RX ORDER — ACETAMINOPHEN 325 MG/1
650 TABLET ORAL ONCE
Status: DISCONTINUED | OUTPATIENT
Start: 2022-08-26 | End: 2022-08-26 | Stop reason: HOSPADM

## 2022-08-26 RX ORDER — FENTANYL CITRATE 50 UG/ML
25 INJECTION, SOLUTION INTRAMUSCULAR; INTRAVENOUS
Status: COMPLETED | OUTPATIENT
Start: 2022-08-26 | End: 2022-08-26

## 2022-08-26 RX ORDER — METOPROLOL SUCCINATE 50 MG/1
50 TABLET, EXTENDED RELEASE ORAL
Status: CANCELLED | OUTPATIENT
Start: 2022-08-26

## 2022-08-26 RX ORDER — ROPINIROLE 1 MG/1
4 TABLET, FILM COATED ORAL
Status: CANCELLED | OUTPATIENT
Start: 2022-08-26

## 2022-08-26 RX ORDER — SODIUM CHLORIDE 9 MG/ML
25 INJECTION, SOLUTION INTRAVENOUS CONTINUOUS
Status: DISCONTINUED | OUTPATIENT
Start: 2022-08-26 | End: 2022-08-26 | Stop reason: HOSPADM

## 2022-08-26 RX ORDER — ESCITALOPRAM OXALATE 10 MG/1
20 TABLET ORAL
Status: CANCELLED | OUTPATIENT
Start: 2022-08-26

## 2022-08-26 RX ORDER — GLYCOPYRROLATE 0.2 MG/ML
INJECTION INTRAMUSCULAR; INTRAVENOUS AS NEEDED
Status: DISCONTINUED | OUTPATIENT
Start: 2022-08-26 | End: 2022-08-26 | Stop reason: HOSPADM

## 2022-08-26 RX ORDER — LIDOCAINE HYDROCHLORIDE 10 MG/ML
0.1 INJECTION, SOLUTION EPIDURAL; INFILTRATION; INTRACAUDAL; PERINEURAL AS NEEDED
Status: DISCONTINUED | OUTPATIENT
Start: 2022-08-26 | End: 2022-08-26 | Stop reason: HOSPADM

## 2022-08-26 RX ORDER — SODIUM CHLORIDE, SODIUM LACTATE, POTASSIUM CHLORIDE, CALCIUM CHLORIDE 600; 310; 30; 20 MG/100ML; MG/100ML; MG/100ML; MG/100ML
1000 INJECTION, SOLUTION INTRAVENOUS CONTINUOUS
Status: DISCONTINUED | OUTPATIENT
Start: 2022-08-26 | End: 2022-08-26 | Stop reason: HOSPADM

## 2022-08-26 RX ORDER — ROPIVACAINE HYDROCHLORIDE 5 MG/ML
30 INJECTION, SOLUTION EPIDURAL; INFILTRATION; PERINEURAL AS NEEDED
Status: DISCONTINUED | OUTPATIENT
Start: 2022-08-26 | End: 2022-08-26 | Stop reason: HOSPADM

## 2022-08-26 RX ORDER — ONDANSETRON 2 MG/ML
4 INJECTION INTRAMUSCULAR; INTRAVENOUS AS NEEDED
Status: DISCONTINUED | OUTPATIENT
Start: 2022-08-26 | End: 2022-08-26 | Stop reason: HOSPADM

## 2022-08-26 RX ORDER — ROCURONIUM BROMIDE 10 MG/ML
INJECTION, SOLUTION INTRAVENOUS AS NEEDED
Status: DISCONTINUED | OUTPATIENT
Start: 2022-08-26 | End: 2022-08-26 | Stop reason: HOSPADM

## 2022-08-26 RX ORDER — ALBUTEROL SULFATE 0.83 MG/ML
2.5 SOLUTION RESPIRATORY (INHALATION) AS NEEDED
Status: DISCONTINUED | OUTPATIENT
Start: 2022-08-26 | End: 2022-08-26 | Stop reason: HOSPADM

## 2022-08-26 RX ORDER — MIDAZOLAM HYDROCHLORIDE 1 MG/ML
INJECTION, SOLUTION INTRAMUSCULAR; INTRAVENOUS AS NEEDED
Status: DISCONTINUED | OUTPATIENT
Start: 2022-08-26 | End: 2022-08-26 | Stop reason: HOSPADM

## 2022-08-26 RX ORDER — GLYCOPYRROLATE 0.2 MG/ML
0.2 INJECTION INTRAMUSCULAR; INTRAVENOUS
Status: DISCONTINUED | OUTPATIENT
Start: 2022-08-26 | End: 2022-08-26 | Stop reason: HOSPADM

## 2022-08-26 RX ORDER — FENTANYL CITRATE 50 UG/ML
INJECTION, SOLUTION INTRAMUSCULAR; INTRAVENOUS AS NEEDED
Status: DISCONTINUED | OUTPATIENT
Start: 2022-08-26 | End: 2022-08-26 | Stop reason: HOSPADM

## 2022-08-26 RX ORDER — FENTANYL CITRATE 50 UG/ML
50 INJECTION, SOLUTION INTRAMUSCULAR; INTRAVENOUS AS NEEDED
Status: DISCONTINUED | OUTPATIENT
Start: 2022-08-26 | End: 2022-08-26 | Stop reason: HOSPADM

## 2022-08-26 RX ADMIN — Medication 80 MCG: at 10:28

## 2022-08-26 RX ADMIN — Medication 3 G: at 09:55

## 2022-08-26 RX ADMIN — FENTANYL CITRATE 50 MCG: 50 INJECTION, SOLUTION INTRAMUSCULAR; INTRAVENOUS at 10:54

## 2022-08-26 RX ADMIN — Medication 80 MCG: at 09:58

## 2022-08-26 RX ADMIN — Medication 80 MCG: at 10:04

## 2022-08-26 RX ADMIN — LIDOCAINE HYDROCHLORIDE 60 MG: 20 INJECTION, SOLUTION EPIDURAL; INFILTRATION; INTRACAUDAL; PERINEURAL at 09:34

## 2022-08-26 RX ADMIN — FENTANYL CITRATE 50 MCG: 50 INJECTION, SOLUTION INTRAMUSCULAR; INTRAVENOUS at 10:25

## 2022-08-26 RX ADMIN — SUCCINYLCHOLINE CHLORIDE 140 MG: 20 INJECTION, SOLUTION INTRAMUSCULAR; INTRAVENOUS at 09:37

## 2022-08-26 RX ADMIN — PROPOFOL 50 MG: 10 INJECTION, EMULSION INTRAVENOUS at 09:37

## 2022-08-26 RX ADMIN — GLYCOPYRROLATE 0.2 MG: 0.2 INJECTION, SOLUTION INTRAMUSCULAR; INTRAVENOUS at 09:49

## 2022-08-26 RX ADMIN — FENTANYL CITRATE 25 MCG: 50 INJECTION, SOLUTION INTRAMUSCULAR; INTRAVENOUS at 11:51

## 2022-08-26 RX ADMIN — ONDANSETRON HYDROCHLORIDE 4 MG: 2 INJECTION, SOLUTION INTRAMUSCULAR; INTRAVENOUS at 09:50

## 2022-08-26 RX ADMIN — ROCURONIUM BROMIDE 10 MG: 10 SOLUTION INTRAVENOUS at 09:36

## 2022-08-26 RX ADMIN — FENTANYL CITRATE 25 MCG: 50 INJECTION, SOLUTION INTRAMUSCULAR; INTRAVENOUS at 11:41

## 2022-08-26 RX ADMIN — FENTANYL CITRATE 25 MCG: 50 INJECTION, SOLUTION INTRAMUSCULAR; INTRAVENOUS at 11:56

## 2022-08-26 RX ADMIN — FENTANYL CITRATE 50 MCG: 50 INJECTION, SOLUTION INTRAMUSCULAR; INTRAVENOUS at 09:49

## 2022-08-26 RX ADMIN — Medication 120 MCG: at 10:34

## 2022-08-26 RX ADMIN — DEXAMETHASONE SODIUM PHOSPHATE 4 MG: 4 INJECTION, SOLUTION INTRAMUSCULAR; INTRAVENOUS at 09:50

## 2022-08-26 RX ADMIN — SODIUM CHLORIDE, POTASSIUM CHLORIDE, SODIUM LACTATE AND CALCIUM CHLORIDE 1000 ML: 600; 310; 30; 20 INJECTION, SOLUTION INTRAVENOUS at 08:13

## 2022-08-26 RX ADMIN — MIDAZOLAM 2 MG: 1 INJECTION INTRAMUSCULAR; INTRAVENOUS at 09:28

## 2022-08-26 RX ADMIN — PROPOFOL 100 MG: 10 INJECTION, EMULSION INTRAVENOUS at 09:36

## 2022-08-26 RX ADMIN — LIDOCAINE HYDROCHLORIDE 40 MG: 20 INJECTION, SOLUTION EPIDURAL; INFILTRATION; INTRACAUDAL; PERINEURAL at 09:35

## 2022-08-26 RX ADMIN — FENTANYL CITRATE 50 MCG: 50 INJECTION, SOLUTION INTRAMUSCULAR; INTRAVENOUS at 09:36

## 2022-08-26 RX ADMIN — Medication 120 MCG: at 10:19

## 2022-08-26 RX ADMIN — FENTANYL CITRATE 25 MCG: 50 INJECTION, SOLUTION INTRAMUSCULAR; INTRAVENOUS at 11:46

## 2022-08-26 RX ADMIN — Medication 80 MCG: at 09:55

## 2022-08-26 NOTE — ANESTHESIA PREPROCEDURE EVALUATION
Relevant Problems   No relevant active problems       Anesthetic History   No history of anesthetic complications            Review of Systems / Medical History  Patient summary reviewed, nursing notes reviewed and pertinent labs reviewed    Pulmonary        Sleep apnea    Asthma        Neuro/Psych             Comments:  Insomnia (G47.00) 09/01/2015   RLS (restless legs syndrome) (G25.81)    Lumbago (M54.50)    WILFREDO (generalized anxiety disorder) Cardiovascular    Hypertension          Hyperlipidemia    Exercise tolerance: <4 METS     GI/Hepatic/Renal     GERD: well controlled           Endo/Other        Morbid obesity and arthritis     Other Findings   Comments: Primary osteoarthritis of left hip  Necrosis of surgical wound ([address]             Physical Exam    Airway  Mallampati: III  TM Distance: 4 - 6 cm  Neck ROM: decreased range of motion   Mouth opening: Diminished (comment)     Cardiovascular  Regular rate and rhythm,  S1 and S2 normal,  no murmur, click, rub, or gallop  Rhythm: regular  Rate: normal         Dental  No notable dental hx       Pulmonary  Breath sounds clear to auscultation               Abdominal  GI exam deferred       Other Findings            Anesthetic Plan    ASA: 3  Anesthesia type: general          Induction: Intravenous  Anesthetic plan and risks discussed with: Patient

## 2022-08-26 NOTE — ANESTHESIA POSTPROCEDURE EVALUATION
Procedure(s):  LEFT HIP DEBRIDEMENT OF WOUND.    general    Anesthesia Post Evaluation      Multimodal analgesia: multimodal analgesia used between 6 hours prior to anesthesia start to PACU discharge  Patient location during evaluation: PACU  Patient participation: complete - patient participated  Level of consciousness: awake  Pain score: 2  Pain management: adequate  Airway patency: patent  Anesthetic complications: no  Cardiovascular status: acceptable  Respiratory status: acceptable  Hydration status: acceptable  Comments: I have evaluated the patient and meets criteria for discharge from PACU. Semaj Pastor MD  Post anesthesia nausea and vomiting:  controlled  Final Post Anesthesia Temperature Assessment:  Normothermia (36.0-37.5 degrees C)      INITIAL Post-op Vital signs:   Vitals Value Taken Time   /60 08/26/22 1130   Temp 36.1 °C (96.9 °F) 08/26/22 1106   Pulse 85 08/26/22 1138   Resp 20 08/26/22 1138   SpO2 93 % 08/26/22 1138   Vitals shown include unvalidated device data.

## 2022-08-26 NOTE — DISCHARGE INSTRUCTIONS
Discharge Instructions Hip Wound Revision  Dr. Lazarus April      Patient Name  Miguel Sterling  Date of procedure  8/26/2022    Procedure  Procedure(s): 8/26/2022  LEFT HIP DEBRIDEMENT OF WOUND  Surgeon  Surgeon(s) and Role:     Celia Patterson MD - Primary  Date of discharge: 8/26/2022  PCP: @PCP@    Follow up care  Follow up visit with Dr. Lazarus April in 2 weeks. Call [phone]  to make an appointment    Activity at home  AVOID sudden and extreme movement of your hip (surgical leg)  Take a short walk every hour; except at night when sleeping  Do your Home Exercise Program 3 times every day   After exercising lie down and elevate your leg on pillows for 15-30 minutes to decrease swelling  Refer to your patient notebook for more information    Bathing and caring for your incision  You may take a shower with your waterproof dressing on your hip. The waterproof dressing is to stay on your hip for 7 days. On the 7th day have someone gently peel the dressing off by lifting the edge and stretching it to break the seal.  You may then leave your incision open to air unless you see drainage from your hip. If you have staple closing your incision they will be removed by home health 14 days after your surgery. Preventing blood clots  Take Aspirin  81 mg every day as prescribed. Call Dr. Lazarus April if you have side effects of blood thinning medication: bleeding, bruising, upset stomach, or diarrhea. Call Dr. Lazarus April for signs of a blood clot in your leg: calf pain, tenderness, redness, swelling of lower leg    Preventing lung congestion  Use your incentive spirometer 4 times a day; do 10 repetitions each time  Remember to keep the small blue ball between the two arrows when taking a slow, deep breath           Pain Management  Get up and walk a short distance to relieve pain and stiffness. Place ice wrap on your hip except when you are walking.  The gel ice packs should be changed about every 4 hours  Elevate your leg on pillows for 15-30 minutes   Take Tylenol 650mg (take two 325mg tablets) every 6 hours for pain. If needed, take a narcotic pain pill every 4-6 hours as prescribed. Take all medications with a small amount of food. As your pain decreases, take the narcotics less often or take ½ of a pill  Call Dr. Alissa Hook if you have side effects from your narcotic pain medication: itching, drowsiness, dizziness, upset stomach, dry mouth, constipation or if you medication is not relieving your pain. Diet after surgery  You may resume your normal diet. Include vegetables, fruit, whole grains, lean meats, and low-fat dairy products. Eat food high in fiber   Drink plenty of fluids, including 8 cups of water daily  Take Senokot-s twice a day to prevent constipation    Avoid after surgery  Do not take any over-the-counter medication for pain except Tylenol  Do not take more than 3000mg (3 grams) of Tylenol in 24 hours. Do not drink alcoholic beverages  Do not smoke  Do not drive until seen for follow up appointment  Do not place frozen gel pack directly on your skin. It can cause frostbite.    Do not take a tub bath, swim or get in a hot tub for 6 weeks  Prevention of falls and safety at home  Set up an area where you can rest comfortably leaving space around furniture to allow you to walk with your walker  Keep stairs, hallways and bathrooms well lit; especially at night  Arrange for care for your pets  Keep your home free of clutter        Call Dr. Alissa Hook at [phone] for:  Pain that is not relieved by pain medication, ice and activity  Side effects of medications  Increased/spread of bruising  Warning signs of infection:  persistent fever greater than 100 degrees  shaking or chills  increased redness, tenderness, swelling or drainage from incision  increased pain during activity or rest  Warning signs of a blood clot in your leg:  increased pain in your calf  tenderness or redness  increased swelling or knee, calf, ankle or foot    Call [phone] after 5pm or on a weekend. The on call physician will return your phone call      Call your Primary Care Doctor for:   Concerns about your medical conditions such as diabetes, high blood pressure, asthma, congestive heart failure  Blood sugars greater than 180  Persistent headache or dizziness  Coughing or congestion  Constipation or diarrhea  Burning when you go to the bathroom  Abnormal heart rate (fast or slow)      Call 911 and go to the nearest hospital for:   Sudden increased shortness of breath  Sudden onset of chest pain  Difficulty breathing  Localized chest pain with coughing or taking a deep breath                                         ______________________________________________________________________    Anesthesia Discharge Instructions    After general anesthesia or intervenous sedation, for 24 hours or while taking prescription Narcotics:  Limit your activities  Do not drive or operate hazardous machinery  If you have not urinated within 8 hours after discharge, please contact your surgeon on call. Do not make important personal or business decisions  Do not drink alcoholic beverages    Report the following to your surgeon:  Excessive pain, swelling, redness or odor of or around the surgical area  Temperature over 100.5 degrees  Nausea and vomiting lasting longer than 4 hours or if unable to take medication  Any signs of decreased circulation or nerve impairment to extremity:  Change in color, persistent numbness, tingling, coldness or increased pain.   Any questions

## 2022-08-26 NOTE — BRIEF OP NOTE
Brief Postoperative Note    Patient: Robert Lazaro  YOB: 1965  MRN: [id number]    Date of Procedure: 8/26/2022     Pre-Op Diagnosis: Postoperative wound dehiscence, initial encounter [T81.31XA]  Status post right hip replacement [Z96.641]    Post-Op Diagnosis: Same as preoperative diagnosis. Superficial fat necrosis. Procedure(s):  LEFT HIP DEBRIDEMENT OF WOUND with wound/Skin revision. Surgeon(s):  Zari Davila MD    Surgical Assistant: Surg Asst-1: Olivia Olivarez    Anesthesia: General     Estimated Blood Loss (mL): Minimal    Complications: None    Specimens: * No specimens in log *     Implants: * No implants in log *    Drains: * No LDAs found *    Findings: mostly just deep to dermis fat necrosis. Small area a bit deeper about 2 cm from skin in mid incision to fat necrosis.       Electronically Signed by Joseph Pillai MD on 8/26/2022 at 11:00 AM

## 2022-08-26 NOTE — H&P
Surgery History and Physical    Subjective:      Carlene Centeno is a 62 y.o. female who presents for evaluation of left hip incision. Past Medical History:   Diagnosis Date    Anxiety     Arthritis     Asthma     mild intermittent    BMI 50.0-59.9, adult (HCC)     GERD (gastroesophageal reflux disease)     Hypertension     Insomnia 09/01/2015    Lumbago     Obesity (BMI 35.0-39.9 without comorbidity)     HERMELINDA treated with BiPAP     DOESNT USE BIPAP    RLS (restless legs syndrome)      Past Surgical History:   Procedure Laterality Date    HX CERVICAL FUSION  2002    DR. CAROLANN ALONSO    HX CHOLECYSTECTOMY  2006    HX COLONOSCOPY      HX HIP REPLACEMENT Left 07/12/2022    HX LUMBAR DISKECTOMY  2000     [facility], [address]    HX LUMBAR FUSION  2004     [facility], [address]    HX TONSILLECTOMY      CHILD    HX TUBAL LIGATION  1999    HX WISDOM TEETH EXTRACTION  18's    X2    MALINA STEREO VAC  BX BREAST LT 1ST LESION W/CLIP AND SPECIMEN Left ?    benign      Family History   Problem Relation Age of Onset    Other Mother         GALLSTONE LODGED BETWEEN BILE DUCT & PANCREAS    Kidney Disease Mother         DUE TO GALLSTONE LODGED BETWEEN BILE DUCT & PANCREAS    COPD Father     Hypertension Father     Arrhythmia Brother         VFIB    Hypertension Brother     OSTEOARTHRITIS Brother     No Known Problems Son     No Known Problems Daughter     Anesth Problems Neg Hx      Social History     Tobacco Use    Smoking status: Former     Types: Cigarettes    Smokeless tobacco: Never   Substance Use Topics    Alcohol use: Not Currently      Prior to Admission medications    Medication Sig Start Date End Date Taking? Authorizing Provider   metoprolol succinate (TOPROL-XL) 50 mg XL tablet TAKE 1 TABLET BY MOUTH EVERY DAY  Patient taking differently: Take 50 mg by mouth nightly. TAKE 1 TABLET BY MOUTH EVERY DAY 11/30/20   Nilesh Smith MD   rOPINIRole (REQUIP) 4 mg tab TAB TAKE 1 TABLET BY MOUTH EVERY DAY AT NIGHT.  Same dose/frequency of prior pcp. Patient taking differently: Take 4 mg by mouth nightly. TAKE 1 TABLET BY MOUTH EVERY DAY AT NIGHT. Same dose/frequency of prior pcp. 11/30/20   Nilesh Smith MD   escitalopram oxalate (LEXAPRO) 20 mg tablet TAKE 1 TABLET BY MOUTH EVERY DAY. For anxiety. Patient taking differently: Take 20 mg by mouth nightly. TAKE 1 TABLET BY MOUTH EVERY DAY. For anxiety. 11/30/20   Nilesh Smith MD      Allergies   Allergen Reactions    Other Plant, Animal, Environmental Other (comments)     DISSOLVABLE STITCHES-DON'T  DISSOLVE        Review of Systems:  A comprehensive review of systems was negative except for that written in the History of Present Illness. Objective:      No data found. No data recorded. Physical Exam:  GENERAL: alert, cooperative, no distress, appears stated age, ABDOMEN: soft, non-tender. Bowel sounds normal. No masses,  no organomegaly, EXTREMITIES:  extremities normal, atraumatic, no cyanosis or edema, no edema, redness or tenderness in the calves or thighs, SKIN: no rash or abnormalities. The incision is dehisced in the middle third of the incision. Does not appear infected. Assessment:     Wound dehiscence, fat necrosis. Plan:     Discussed the risk of surgery including infection, pain, nerve damage and the risks of general anesthetic. The patient understands the risks; any and all questions were answered to the patient's satisfaction.

## 2022-08-26 NOTE — PERIOP NOTES
I have reviewed discharge instructions in person with the patient and  using teach back method. The patient and  verbalized understanding. Medications, signs, symptoms, and side effects reviewed. Opportunity for questions and clarification allowed. Patient discharging home via private vehicle. Hard-copy of discharge instructions given to patient. Copy of discharge instructions signed and placed on patient's chart.

## 2022-08-26 NOTE — H&P
Maximus Kaur MD   Physician   Specialty:  Orthopedic Surgery   Progress Notes      Signed   Encounter Date:  2022                                                                                                                                                                                                                                            Amanda Edwards (: 1965) is a 64 y.o. female, patient, here for evaluation of the following chief complaint(s): Wound Check ([facility] 2022)        HPI:     Follow-up visit status post left total hip. No Known Allergies          Current Outpatient Medications   Medication Sig    BD Ultra-Fine Short Pen Needle 31 gauge x 5/16\" ndle USE TO INJECT SAXENDA ONCE DAILY    cephALEXin (KEFLEX) 500 mg capsule Take 1 Capsule by mouth four (4) times daily. Indications: an infection of the skin and the tissue below the skin    apixaban (ELIQUIS) 2.5 mg tablet Take 1 Tablet by mouth two (2) times a day for 30 days. polyethylene glycol (MIRALAX) 17 gram packet Take 1 Packet by mouth daily for 14 days. senna-docusate (PERICOLACE) 8.6-50 mg per tablet Take 1 Tablet by mouth two (2) times a day for 30 days. liraglutide (SAXENDA SC) 18 mg by SubCUTAneous route nightly. metoprolol succinate (TOPROL-XL) 50 mg XL tablet TAKE 1 TABLET BY MOUTH EVERY DAY (Patient taking differently: Take 50 mg by mouth nightly. TAKE 1 TABLET BY MOUTH EVERY DAY)    rOPINIRole (REQUIP) 4 mg tab TAB TAKE 1 TABLET BY MOUTH EVERY DAY AT NIGHT. Same dose/frequency of prior pcp. (Patient taking differently: Take 4 mg by mouth nightly. TAKE 1 TABLET BY MOUTH EVERY DAY AT NIGHT. Same dose/frequency of prior pcp.)    escitalopram oxalate (LEXAPRO) 20 mg tablet TAKE 1 TABLET BY MOUTH EVERY DAY. For anxiety. (Patient taking differently: Take 20 mg by mouth nightly. TAKE 1 TABLET BY MOUTH EVERY DAY. For anxiety.)      No current facility-administered medications for this visit. Past Medical History:   Diagnosis Date    Anxiety      Arthritis      Asthma       mild intermittent    BMI 50.0-59.9, adult (HCC)      GERD (gastroesophageal reflux disease)      Hypertension      Insomnia 9/1/2015    Lumbago      Obesity (BMI 35.0-39.9 without comorbidity)      HERMELINDA treated with BiPAP      RLS (restless legs syndrome)                 Past Surgical History:   Procedure Laterality Date    HX CERVICAL FUSION   2002      [facility], [address]    HX CHOLECYSTECTOMY   2006    HX COLONOSCOPY        HX LUMBAR DISKECTOMY   2000      [facility], [address]    HX LUMBAR FUSION   2004      [facility], [address]    HX TONSILLECTOMY         CHILD    HX TUBAL LIGATION   1999    HX WISDOM TEETH EXTRACTION   1980's     X2    MALNIA STEREO VAC  BX BREAST LT 1ST LESION W/CLIP AND SPECIMEN Left ?     benign               Family History   Problem Relation Age of Onset    Other Mother           GALLSTONE LODGED BETWEEN BILE DUCT & PANCREAS    Kidney Disease Mother           DUE TO GALLSTONE LODGED BETWEEN BILE DUCT & PANCREAS    COPD Father      Hypertension Father      Arrhythmia Brother           VFIB    Hypertension Brother      OSTEOARTHRITIS Brother      No Known Problems Son      No Known Problems Daughter      Anesth Problems Neg Hx           Social History            Socioeconomic History    Marital status:        Spouse name: Not on file    Number of children: Not on file    Years of education: Not on file    Highest education level: Not on file   Occupational History    Not on file   Tobacco Use    Smoking status: Never    Smokeless tobacco: Never   Vaping Use    Vaping Use: Some days    Substances: THC    Devices: Pre-filled or refillable cartridge   Substance and Sexual Activity    Alcohol use:  Yes       Alcohol/week: 2.0 standard drinks       Types: 1 Glasses of wine, 1 Shots of liquor per week       Comment: 5 WEEK (1 DRINK NIGHTLY)    Drug use: Yes       Types: Marijuana    Sexual activity: Not on file Other Topics Concern    Not on file   Social History Narrative    Not on file      Social Determinants of Health      Financial Resource Strain: Not on file   Food Insecurity: Not on file   Transportation Needs: Not on file   Physical Activity: Not on file   Stress: Not on file   Social Connections: Not on file   Intimate Partner Violence: Not on file   Housing Stability: Not on file         ROS    Positive for: Skin, Musculoskeletal  Last edited by Natalee Sanford on 7/26/2022  1:22 PM.                Vitals:  LMP 07/09/2019    There is no height or weight on file to calculate BMI. PHYSICAL EXAM:  Staple reaction with redness along the suture line. Staples were removed. Steri-Strips applied. IMAGING:  None     ASSESSMENT/PLAN:  1. Status post left hip replacement  Removed all the staples and applied Steri-Strips. Finish out Keflex since she started it. Follow-up in 10 days with [facility]. An electronic signature was used to authenticate this note.   --Joseph Pillai MD                        Electronically signed by Zari Davila MD at 07/26/22 1328  Note Details    Author Zari Davila MD File Time 07/26/22 1328   Author Type Physician Status Signed   Last  Zari Davila MD Specialty Orthopedic Surgery   Office Visit on 7/26/2022          Office Visit on 7/26/2022              Detailed Report          Note shared with patient        Additional Documentation    Vitals:  LMP 07/09/2019     Encounter Info:  Billing Info,    History,    Allergies,    Detailed Report

## 2022-08-26 NOTE — OP NOTES
[address]   OPERATIVE REPORT    Name:  Coty Acuna  MR#:  [id number]  :  1965  ACCOUNT #:  [de-identified]  DATE OF SERVICE:  2022    PREOPERATIVE DIAGNOSIS:  Fat necrosis with wound dehiscence, left total hip replacement. POSTOPERATIVE DIAGNOSIS:  Fat necrosis with wound dehiscence, left total hip replacement. PROCEDURE PERFORMED:  1. Wound revision. 2.  Debridement of necrotic fat. SURGEON:  Molly Zelaya MD    ASSISTANT:  None. ANESTHESIA:  General.    COMPLICATIONS:  None. SPECIMENS REMOVED:  None. PREOPERATIVE ANTIBIOTICS:  Ancef 2 g. IMPLANTS:  None. ESTIMATED BLOOD LOSS:  Minimal.    INDICATIONS:  A 49-year-old woman status post total hip about three months ago started having reaction to superficial sutures which we observed. This continued to progress and she has several areas of skin dehiscence likely with some underlying fat necrosis. PROCEDURE:  Anesthetic was initiated. Preoperative dose of antibiotic was given. She was turned lateral left side and was prepped and draped in the usual sterile fashion. Incised the central portion of her skin incision and debrided the necrotic skin edges, dissected down through the subcutaneous tissue. This did not extend deep probably about 2 cm below the skin. All necrotic tissue was debrided, copiously irrigated the wound. Several Monocryl sutures were used to reapproximate the skin edges and then the skin closure was made with horizontal mattress nylon sutures. There were no complications. No specimen. Procedure was debridement of necrotic fat and subcutaneous tissue and revision of left hip wound. The patient was awakened from anesthetic and taken to the recovery room stable.       July Perkins MD [email or web address]  D:  2022 11:09  T:  2022 12:45  JOB #:  [id number]

## 2022-08-26 NOTE — PROGRESS NOTES
Amy Mcgee PA-C   Physician Assistant   Specialty:  Orthopedic Surgery   Progress Notes      Signed   Encounter Date:  2022                                                                                                                                                                                                                                                                                                                            Samantha Matias (: 1965) is a 62 y.o. female, established patient, here for evaluation of the following chief complaint(s):  Surgical Follow-up        SUBJECTIVE/OBJECTIVE:     Samantha Matias (: 1965) is a 62 y.o. female. Left Total Hip Arthroplasty - Left 2022      The patient has developed mild wound dehiscence over the past 2 weeks. Patient has been doing hydrogen peroxide to cleanse the wound and keep the wound covered. Patient returns today for wound check. Patient denies fevers, chills or increased pain. Overall, she feels like the hip is doing quite well. No Known Allergies          Current Outpatient Medications   Medication Sig    BD Ultra-Fine Short Pen Needle 31 gauge x 5/16\" ndle USE TO INJECT SAXENDA ONCE DAILY    liraglutide (SAXENDA SC) 18 mg by SubCUTAneous route nightly. metoprolol succinate (TOPROL-XL) 50 mg XL tablet TAKE 1 TABLET BY MOUTH EVERY DAY (Patient taking differently: Take 50 mg by mouth nightly. TAKE 1 TABLET BY MOUTH EVERY DAY)    rOPINIRole (REQUIP) 4 mg tab TAB TAKE 1 TABLET BY MOUTH EVERY DAY AT NIGHT. Same dose/frequency of prior pcp. (Patient taking differently: Take 4 mg by mouth nightly. TAKE 1 TABLET BY MOUTH EVERY DAY AT NIGHT. Same dose/frequency of prior pcp.)    escitalopram oxalate (LEXAPRO) 20 mg tablet TAKE 1 TABLET BY MOUTH EVERY DAY. For anxiety. (Patient taking differently: Take 20 mg by mouth nightly. TAKE 1 TABLET BY MOUTH EVERY DAY.  For anxiety.)      No current facility-administered medications for this visit. Social History            Socioeconomic History    Marital status:        Spouse name: Not on file    Number of children: Not on file    Years of education: Not on file    Highest education level: Not on file   Occupational History    Not on file   Tobacco Use    Smoking status: Never    Smokeless tobacco: Never   Vaping Use    Vaping Use: Some days    Substances: THC    Devices: Pre-filled or refillable cartridge   Substance and Sexual Activity    Alcohol use: Yes       Alcohol/week: 2.0 standard drinks       Types: 1 Glasses of wine, 1 Shots of liquor per week       Comment: 5 WEEK (1 DRINK NIGHTLY)    Drug use: Yes       Types: Marijuana    Sexual activity: Not on file   Other Topics Concern    Not on file   Social History Narrative    Not on file      Social Determinants of Health      Financial Resource Strain: Not on file   Food Insecurity: Not on file   Transportation Needs: Not on file   Physical Activity: Not on file   Stress: Not on file   Social Connections: Not on file   Intimate Partner Violence: Not on file   Housing Stability: Not on file               Past Surgical History:   Procedure Laterality Date    HX CERVICAL FUSION   2002      [facility], [address]    HX CHOLECYSTECTOMY   2006    HX COLONOSCOPY        HX LUMBAR DISKECTOMY   2000      [facility], [address]    HX LUMBAR FUSION   2004       [facility], [address]    HX TONSILLECTOMY         CHILD    HX TUBAL LIGATION   1999    HX WISDOM TEETH EXTRACTION   18's     X2    MALINA STEREO VAC  BX BREAST LT 1ST LESION W/CLIP AND SPECIMEN Left ?     benign               Family History   Problem Relation Age of Onset    Other Mother           GALLSTONE LODGED BETWEEN BILE DUCT & PANCREAS    Kidney Disease Mother           DUE TO GALLSTONE LODGED BETWEEN BILE DUCT & PANCREAS    COPD Father      Hypertension Father      Arrhythmia Brother           VFIB    Hypertension Brother      OSTEOARTHRITIS Brother      No Known Problems Son      No Known Problems Daughter      Anesth Problems Neg Hx           OB History    No obstetric history on file. REVIEW OF SYSTEMS:     Patient denies any recent fever, chills, nausea, vomiting, chest pain, or shortness of breath. Vitals:     Ht 5' 3.75\" (1.619 m)   Wt 273 lb (123.8 kg)   LMP 07/09/2019   BMI 47.23 kg/m²    Body mass index is 47.23 kg/m². PHYSICAL EXAM:     The patient is alert and oriented x3 and in no acute distress. The postoperative wound is well-healed with the exception of the midportion of the wound. There is 1 cm x 4 mm area at the midportion of the incision which has dehisced and has filled in with fibrinous exudate. There is pain free ROM of the operative hip. There is no calf tenderness to palpation. Distal motor and sensation is intact. IMAGING:     Results from Appointment encounter on 08/03/22     XR HIP LT W OR WO PELV 2-3 VWS     Narrative  AP pelvis, AP and frog lateral digital view radiographs of the left hip were obtained in the office today and reviewed with the patient and demonstrate anatomic alignment of components with no evidence of hardware loosening or subsidence. No orders of the defined types were placed in this encounter. ASSESSMENT/PLAN:        1. Status post total replacement of left hip  2. Dehiscence of operative wound, initial encounter           Below is the assessment and plan developed based on review of pertinent history, physical exam, labs, studies, and medications. Have discussed radiographic findings and answered all patient questions to her satisfaction. Dr. Elkin Arreaga personally examined the wound and agrees that the patient needs to have wound revision due to fat necrosis. There is no obvious evidence of wound infection. Will plan on wound revision of the left total hip wound on Friday, 8/26/2022.   The procedure, risks and rehab concerns were discussed at length including potential complications. The patient was asked to contact the office with any questions or concerns. The patient understands and agrees to the treatment plan as outlined above. Return in about 4 weeks (around 9/20/2022) for post op follow up. Dr. Liane Torres was available for immediate consultation as needed. An electronic signature was used to authenticate this note.   -- Aubry Cockayne, PA-C          Cosigned by: Ke Chapman MD at 08/23/22 1537   Electronically signed by Phoenix Escamilla PA-C at 08/23/22 1350   Electronically signed by Ke Chapman MD at 08/23/22 1537  Note Details    Author Phoenix Escamilla PA-C File Time 08/23/22 1350   Author Type Physician Assistant Status Signed   Last  [facility]   Office Visit on 8/23/2022          Office Visit on 8/23/2022              Revision History              Detailed Report          Note shared with patient        Additional Documentation    Vitals:  Ht 5' 3.75\" (1.619 m)    Wt 273 lb (123.8 kg)    LMP 07/09/2019    BMI 47.23 kg/m²    BSA 2.36 m²     Flowsheets:  Fluid Management       Encounter Info:  Billing Info,    History,    Allergies,    Detailed Report

## 2022-08-27 ENCOUNTER — HOSPITAL ENCOUNTER (EMERGENCY)
Age: 57
Discharge: HOME | End: 2022-08-27
Attending: EMERGENCY MEDICINE
Payer: COMMERCIAL

## 2022-08-27 VITALS
TEMPERATURE: 97.6 F | HEART RATE: 66 BPM | OXYGEN SATURATION: 95 % | WEIGHT: 280 LBS | RESPIRATION RATE: 19 BRPM | HEIGHT: 63 IN | DIASTOLIC BLOOD PRESSURE: 73 MMHG | SYSTOLIC BLOOD PRESSURE: 129 MMHG | BODY MASS INDEX: 49.61 KG/M2

## 2022-08-27 DIAGNOSIS — Z48.89 ENCOUNTER FOR POST SURGICAL WOUND CHECK: Primary | ICD-10-CM

## 2022-08-27 PROCEDURE — 75810000275 HC EMERGENCY DEPT VISIT NO LEVEL OF CARE

## 2022-08-27 NOTE — ED PROVIDER NOTES
Patient is a 80-year-old female with past medical history of anxiety, arthritis, GERD, hypertension, sleep apnea presenting for evaluation of surgical wound. Reports that she had a left-sided hip arthroplasty. Her wound dehisced. She went to the OR yesterday for wound debridement. Today, her dog jumped up on her and she noticed that her dressing became bloody. She called her on-call orthopedist, who advised her just to change the dressing. However, she had no materials to change the dressing so presented to the ER for wound check and dressing change. Denies any increased pain. Denies any other complaints. Past Medical History:   Diagnosis Date    Anxiety     Arthritis     Asthma     mild intermittent    BMI 50.0-59.9, adult ([facility])     GERD (gastroesophageal reflux disease)     Hypertension     Insomnia 09/01/2015    Lumbago     Obesity (BMI 35.0-39.9 without comorbidity)     HERMELINDA treated with BiPAP     DOESNT USE BIPAP    RLS (restless legs syndrome)        Past Surgical History:   Procedure Laterality Date    HX CERVICAL FUSION  2002    DR. CAROLANN ALONSO    HX CHOLECYSTECTOMY  2006    HX COLONOSCOPY      HX HIP REPLACEMENT Left 07/12/2022    HX LUMBAR DISKECTOMY  2000     [facility], [address]    HX LUMBAR FUSION  2004      [facility], [address]    HX TONSILLECTOMY      CHILD    HX TUBAL LIGATION  1999    HX WISDOM TEETH EXTRACTION  18's    X2    MALINA STEREO VAC  BX BREAST LT 1ST LESION W/CLIP AND SPECIMEN Left ?    benign         Family History:   Problem Relation Age of Onset    Other Mother         GALLSTONE LODGED BETWEEN BILE DUCT & PANCREAS    Kidney Disease Mother         DUE TO GALLSTONE LODGED BETWEEN BILE DUCT & PANCREAS    COPD Father     Hypertension Father     Arrhythmia Brother         VFIB    Hypertension Brother     OSTEOARTHRITIS Brother     No Known Problems Son     No Known Problems Daughter     Anesth Problems Neg Hx        Social History     Socioeconomic History    Marital status:      Spouse name: Not on file    Number of children: Not on file    Years of education: Not on file    Highest education level: Not on file   Occupational History    Not on file   Tobacco Use    Smoking status: Former     Types: Cigarettes    Smokeless tobacco: Never   Vaping Use    Vaping Use: Some days    Substances: THC    Devices: Pre-filled or refillable cartridge   Substance and Sexual Activity    Alcohol use: Not Currently    Drug use: Not Currently    Sexual activity: Not on file   Other Topics Concern    Not on file   Social History Narrative    Not on file     Social Determinants of Health     Financial Resource Strain: Not on file   Food Insecurity: Not on file   Transportation Needs: Not on file   Physical Activity: Not on file   Stress: Not on file   Social Connections: Not on file   Intimate Partner Violence: Not on file   Housing Stability: Not on file         ALLERGIES: Other plant, animal, environmental    Review of Systems   Constitutional:  Negative for unexpected weight change. HENT:  Negative for congestion. Eyes:  Negative for visual disturbance. Respiratory:  Negative for cough, chest tightness and shortness of breath. Cardiovascular:  Negative for chest pain. Gastrointestinal:  Negative for abdominal pain and nausea. Endocrine: Negative for polyuria. Genitourinary:  Negative for dysuria and flank pain. Musculoskeletal:  Negative for back pain. Skin:  Negative for pallor. Allergic/Immunologic: Negative for immunocompromised state. Neurological:  Negative for dizziness and headaches. Hematological:  Negative for adenopathy. Psychiatric/Behavioral:  Negative for agitation. Vitals:    08/27/22 1421   BP: 129/73   Pulse: 66   Resp: 19   Temp: 97.6 °F (36.4 °C)   SpO2: 95%   Weight: 127 kg (280 lb)   Height: 5' 3\" (1.6 m)            Physical Exam  Vitals and nursing note reviewed. Constitutional:       General: She is not in acute distress. Appearance: Normal appearance. She is obese. HENT:      Head: Atraumatic. Eyes:      Conjunctiva/sclera: Conjunctivae normal.      Pupils: Pupils are equal, round, and reactive to light. Cardiovascular:      Rate and Rhythm: Normal rate. Pulmonary:      Effort: Pulmonary effort is normal. No respiratory distress. Abdominal:      General: Abdomen is flat. Musculoskeletal:         General: Normal range of motion. Cervical back: Neck supple. Skin:     General: Skin is warm. Capillary Refill: Capillary refill takes less than 2 seconds. Comments: Left hip dressing in place. Pooled blood seen underneath dressing. NVI   Neurological:      General: No focal deficit present. Mental Status: She is alert and oriented to person, place, and time. Mental status is at baseline. Psychiatric:         Mood and Affect: Mood normal.         Behavior: Behavior normal.        MDM  Number of Diagnoses or Management Options  Diagnosis management comments: Patient presents with concern for postoperative wound, blood on dressing. Dressing pulled up at bedside revealing no active bleeding from sutures. All sutures intact without evidence of purulent drainage, bleeding, or wound dehiscence. Dressing reinforced with Mepilex. Advised patient to call her orthopedist to see if they would like to see her earlier in office for wound re-evaluation or dressing replacement. Discussed my clinical impression(s), any labs and/or radiology results with the patient. I answered any questions and addressed any concerns. Discussed the importance of following up with their primary care physician and/or specialist(s). Discussed signs or symptoms that would warrant return back to the ER for further evaluation. The patient is agreeable with discharge.            Procedures

## 2022-08-27 NOTE — ED TRIAGE NOTES
Had L hip debriedment yesterday. Dressing has become bloody after her dog jumped up on it. Would like wound evaluated. Denies fevers or chills.

## 2022-09-09 ENCOUNTER — OFFICE VISIT (OUTPATIENT)
Dept: ORTHOPEDIC SURGERY | Age: 57
End: 2022-09-09
Payer: COMMERCIAL

## 2022-09-09 VITALS — HEIGHT: 63 IN | WEIGHT: 280 LBS | BODY MASS INDEX: 49.61 KG/M2

## 2022-09-09 DIAGNOSIS — T81.31XA DEHISCENCE OF OPERATIVE WOUND, INITIAL ENCOUNTER: ICD-10-CM

## 2022-09-09 DIAGNOSIS — Z96.642 STATUS POST TOTAL REPLACEMENT OF LEFT HIP: Primary | ICD-10-CM

## 2022-09-09 PROCEDURE — 99024 POSTOP FOLLOW-UP VISIT: CPT | Performed by: PHYSICIAN ASSISTANT

## 2022-09-09 RX ORDER — CEPHALEXIN 500 MG/1
500 CAPSULE ORAL 4 TIMES DAILY
Qty: 40 CAPSULE | Refills: 0 | Status: SHIPPED | OUTPATIENT
Start: 2022-09-09

## 2022-09-09 NOTE — PROGRESS NOTES
Hao Lerner (: 1965) is a 62 y.o. female, established patient, here for evaluation of the following chief complaint(s):  Surgical Follow-up       SUBJECTIVE/OBJECTIVE:    Hao Lerner (: 1965) is a 62 y.o. female. Left Hip Debridement Of Wound - Left 2022     Left total hip arthroplasty posterior approach -2022      The patient is 2-week status post left hip wound debridement following total hip arthroplasty. The patient states that she is doing well and is having minimal postoperative discomfort. Patient continues to have a slight amount of oozing from the proximal wound. Patient presents today for wound check. Patient denies fevers or chills. Allergies   Allergen Reactions    Other Plant, Animal, Environmental Other (comments)     DISSOLVABLE STITCHES-DON'T  DISSOLVE        Current Outpatient Medications   Medication Sig    cephALEXin (KEFLEX) 500 mg capsule Take 1 Capsule by mouth four (4) times daily. oxyCODONE IR (ROXICODONE) 5 mg immediate release tablet Take 1 Tablet by mouth every four (4) hours as needed for Pain for up to 14 days. Max Daily Amount: 30 mg. Indications: pain, s/p wound revision    metoprolol succinate (TOPROL-XL) 50 mg XL tablet TAKE 1 TABLET BY MOUTH EVERY DAY (Patient taking differently: Take 50 mg by mouth nightly. TAKE 1 TABLET BY MOUTH EVERY DAY)    rOPINIRole (REQUIP) 4 mg tab TAB TAKE 1 TABLET BY MOUTH EVERY DAY AT NIGHT. Same dose/frequency of prior pcp. (Patient taking differently: Take 4 mg by mouth nightly. TAKE 1 TABLET BY MOUTH EVERY DAY AT NIGHT. Same dose/frequency of prior pcp.)    escitalopram oxalate (LEXAPRO) 20 mg tablet TAKE 1 TABLET BY MOUTH EVERY DAY. For anxiety. (Patient taking differently: Take 20 mg by mouth nightly. TAKE 1 TABLET BY MOUTH EVERY DAY. For anxiety.)     No current facility-administered medications for this visit.        Social History     Socioeconomic History    Marital status:      Spouse name: Not on file    Number of children: Not on file    Years of education: Not on file    Highest education level: Not on file   Occupational History    Not on file   Tobacco Use    Smoking status: Former     Types: Cigarettes    Smokeless tobacco: Never   Vaping Use    Vaping Use: Some days    Substances: THC    Devices: Pre-filled or refillable cartridge   Substance and Sexual Activity    Alcohol use: Not Currently    Drug use: Not Currently    Sexual activity: Not on file   Other Topics Concern    Not on file   Social History Narrative    Not on file     Social Determinants of Health     Financial Resource Strain: Not on file   Food Insecurity: Not on file   Transportation Needs: Not on file   Physical Activity: Not on file   Stress: Not on file   Social Connections: Not on file   Intimate Partner Violence: Not on file   Housing Stability: Not on file       Past Surgical History:   Procedure Laterality Date    HX CERVICAL FUSION  2002    DR. CAROLANN ALONSO    HX CHOLECYSTECTOMY  2006    HX COLONOSCOPY      HX HIP REPLACEMENT Left 07/12/2022    HX LUMBAR DISKECTOMY  2000     [facility], [address]    HX LUMBAR FUSION  2004     [facility], [address]    HX TONSILLECTOMY      CHILD    HX TUBAL LIGATION  1999    HX WISDOM TEETH EXTRACTION  18's    X2    MALINA STEREO VAC  BX BREAST LT 1ST LESION W/CLIP AND SPECIMEN Left ?    benign       Family History   Problem Relation Age of Onset    Other Mother         GALLSTONE LODGED BETWEEN BILE DUCT & PANCREAS    Kidney Disease Mother         DUE TO GALLSTONE LODGED BETWEEN BILE DUCT & PANCREAS    COPD Father     Hypertension Father     Arrhythmia Brother         VFIB    Hypertension Brother     OSTEOARTHRITIS Brother     No Known Problems Son     No Known Problems Daughter     Anesth Problems Neg Hx         OB History    No obstetric history on file.             REVIEW OF SYSTEMS:    Patient denies any recent fever, chills, nausea, vomiting, chest pain, or shortness of breath. Vitals:    Ht 5' 3\" (1.6 m)   Wt 280 lb (127 kg)   LMP 07/09/2019   BMI 49.60 kg/m²    Body mass index is 49.6 kg/m². PHYSICAL EXAM:    The patient is alert and oriented x3 and in no acute distress. The postoperative wound is well-healed with the exception of the proximal incision where there is a scant amount of serous drainage. There is pain free ROM of the operative hip. There is no calf tenderness to palpation. Distal motor and sensation is intact. IMAGING:    Results from Appointment encounter on 08/03/22    XR HIP LT W OR WO PELV 2-3 VWS    Narrative  AP pelvis, AP and frog lateral digital view radiographs of the left hip were obtained in the office today and reviewed with the patient and demonstrate anatomic alignment of components with no evidence of hardware loosening or subsidence. Orders Placed This Encounter    cephALEXin (KEFLEX) 500 mg capsule     Sig: Take 1 Capsule by mouth four (4) times daily. Dispense:  40 Capsule     Refill:  0          ASSESSMENT/PLAN:      1. Status post total replacement of left hip  2. Dehiscence of operative wound, initial encounter        Below is the assessment and plan developed based on review of pertinent history, physical exam, labs, studies, and medications. Have discussed radiographic findings and answered all patient questions to her satisfaction. Dr. Elkin Arreaga personally assessed the patient's wound today. He would like to leave the sutures in for 1 more week before removing them. The patient was placed on Keflex prophylactically. We will will plan on seeing the patient back for follow-up in 1 weeks time for suture removal.  The patient was asked to contact the office with any questions or concerns. The patient understands and agrees to the treatment plan as outlined above. Return in about 1 year (around 9/9/2023) for post op follow up. Dr. Elkin Arreaga was available for immediate consultation as needed. An electronic signature was used to authenticate this note.   -- Noa Lindo PA-C

## 2022-09-16 ENCOUNTER — OFFICE VISIT (OUTPATIENT)
Dept: ORTHOPEDIC SURGERY | Age: 57
End: 2022-09-16
Payer: COMMERCIAL

## 2022-09-16 VITALS — WEIGHT: 280 LBS | HEIGHT: 63 IN | BODY MASS INDEX: 49.61 KG/M2

## 2022-09-16 DIAGNOSIS — Z96.642 STATUS POST TOTAL REPLACEMENT OF LEFT HIP: Primary | ICD-10-CM

## 2022-09-16 DIAGNOSIS — T81.31XA DEHISCENCE OF OPERATIVE WOUND, INITIAL ENCOUNTER: ICD-10-CM

## 2022-09-16 PROCEDURE — 99024 POSTOP FOLLOW-UP VISIT: CPT | Performed by: PHYSICIAN ASSISTANT

## 2022-09-16 NOTE — PROGRESS NOTES
Jalen Bacon (: 1965) is a 62 y.o. female, established patient, here for evaluation of the following chief complaint(s):  Surgical Follow-up       SUBJECTIVE/OBJECTIVE:    Jalen Bacon (: 1965) is a 62 y.o. female. Left Hip Debridement Of Wound - Left 2022     Left total hip arthroplasty posterior approach-2022    Patient returns for second postoperative visit status post left hip wound debridement. Patient states she is doing well at this time is having little or no postoperative discomfort. Patient has returned to work. The patient is very pleased with the results of her surgery and the progress which she has made to date. Allergies   Allergen Reactions    Other Plant, Animal, Environmental Other (comments)     DISSOLVABLE STITCHES-DON'T  DISSOLVE        Current Outpatient Medications   Medication Sig    cephALEXin (KEFLEX) 500 mg capsule Take 1 Capsule by mouth four (4) times daily. metoprolol succinate (TOPROL-XL) 50 mg XL tablet TAKE 1 TABLET BY MOUTH EVERY DAY (Patient taking differently: Take 50 mg by mouth nightly. TAKE 1 TABLET BY MOUTH EVERY DAY)    rOPINIRole (REQUIP) 4 mg tab TAB TAKE 1 TABLET BY MOUTH EVERY DAY AT NIGHT. Same dose/frequency of prior pcp. (Patient taking differently: Take 4 mg by mouth nightly. TAKE 1 TABLET BY MOUTH EVERY DAY AT NIGHT. Same dose/frequency of prior pcp.)    escitalopram oxalate (LEXAPRO) 20 mg tablet TAKE 1 TABLET BY MOUTH EVERY DAY. For anxiety. (Patient taking differently: Take 20 mg by mouth nightly. TAKE 1 TABLET BY MOUTH EVERY DAY. For anxiety.)     No current facility-administered medications for this visit.        Social History     Socioeconomic History    Marital status:      Spouse name: Not on file    Number of children: Not on file    Years of education: Not on file    Highest education level: Not on file   Occupational History    Not on file   Tobacco Use    Smoking status: Former     Types: Cigarettes Smokeless tobacco: Never   Vaping Use    Vaping Use: Some days    Substances: THC    Devices: Pre-filled or refillable cartridge   Substance and Sexual Activity    Alcohol use: Not Currently    Drug use: Not Currently    Sexual activity: Not on file   Other Topics Concern    Not on file   Social History Narrative    Not on file     Social Determinants of Health     Financial Resource Strain: Not on file   Food Insecurity: Not on file   Transportation Needs: Not on file   Physical Activity: Not on file   Stress: Not on file   Social Connections: Not on file   Intimate Partner Violence: Not on file   Housing Stability: Not on file       Past Surgical History:   Procedure Laterality Date    HX CERVICAL FUSION  2002    DR. CAROLANN ALONSO    HX CHOLECYSTECTOMY  2006    HX COLONOSCOPY      HX HIP REPLACEMENT Left 07/12/2022    HX LUMBAR DISKECTOMY  2000     [facility], [address]    HX LUMBAR FUSION  2004     [facility], [address]    HX TONSILLECTOMY      CHILD    HX TUBAL LIGATION  1999    HX WISDOM TEETH EXTRACTION  18's    X2    MALINA STEREO VAC  BX BREAST LT 1ST LESION W/CLIP AND SPECIMEN Left ?    benign       Family History   Problem Relation Age of Onset    Other Mother         GALLSTONE LODGED BETWEEN BILE DUCT & PANCREAS    Kidney Disease Mother         DUE TO GALLSTONE LODGED BETWEEN BILE DUCT & PANCREAS    COPD Father     Hypertension Father     Arrhythmia Brother         VFIB    Hypertension Brother     OSTEOARTHRITIS Brother     No Known Problems Son     No Known Problems Daughter     Anesth Problems Neg Hx         OB History    No obstetric history on file. REVIEW OF SYSTEMS:    Patient denies any recent fever, chills, nausea, vomiting, chest pain, or shortness of breath. Vitals:    Ht 5' 3\" (1.6 m)   Wt 280 lb (127 kg)   LMP 07/09/2019   BMI 49.60 kg/m²    Body mass index is 49.6 kg/m². PHYSICAL EXAM:    The patient is alert and oriented x3 and in no acute distress.   The postoperative wound is well-healed without erythema or drainage. There is pain free ROM of the operative hip. There is no calf tenderness to palpation. Distal motor and sensation is intact. IMAGING:    Results from Appointment encounter on 08/03/22    XR HIP LT W OR WO PELV 2-3 VWS    Narrative  AP pelvis, AP and frog lateral digital view radiographs of the left hip were obtained in the office today and reviewed with the patient and demonstrate anatomic alignment of components with no evidence of hardware loosening or subsidence. No orders of the defined types were placed in this encounter. ASSESSMENT/PLAN:      1. Status post total replacement of left hip  2. Dehiscence of operative wound, initial encounter        Below is the assessment and plan developed based on review of pertinent history, physical exam, labs, studies, and medications. Have discussed radiographic findings and answered all patient questions to her satisfaction. Have encouraged patient to continue home exercise program and walking program as tolerated. We will plan on seeing patient back for follow-up in 2 months time for reevaluation with Dr. Becky Farfan. Sooner should the patient develop any postoperative complications. The patient was asked to contact the office with any questions or concerns. The patient understands and agrees to the treatment plan as outlined above. Return in about 2 months (around 11/16/2022) for post op follow up. Dr. Becky Farfan was available for immediate consultation as needed. An electronic signature was used to authenticate this note.   -- Carol Renee PA-C

## 2023-11-21 ENCOUNTER — TRANSCRIBE ORDERS (OUTPATIENT)
Facility: HOSPITAL | Age: 58
End: 2023-11-21

## 2023-11-21 DIAGNOSIS — M84.374A STRESS FRACTURE OF METATARSAL BONE OF RIGHT FOOT, INITIAL ENCOUNTER: Primary | ICD-10-CM

## 2023-12-05 ENCOUNTER — HOSPITAL ENCOUNTER (OUTPATIENT)
Age: 58
Discharge: HOME | End: 2023-12-08
Payer: COMMERCIAL

## 2023-12-05 DIAGNOSIS — M84.374A STRESS FRACTURE OF METATARSAL BONE OF RIGHT FOOT, INITIAL ENCOUNTER: ICD-10-CM

## 2023-12-05 PROCEDURE — 73718 MRI LOWER EXTREMITY W/O DYE: CPT

## 2024-01-15 ENCOUNTER — ANESTHESIA EVENT (OUTPATIENT)
Facility: HOSPITAL | Age: 59
End: 2024-01-15
Payer: COMMERCIAL

## 2024-01-16 ENCOUNTER — HOSPITAL ENCOUNTER (OUTPATIENT)
Facility: HOSPITAL | Age: 59
Setting detail: OUTPATIENT SURGERY
Discharge: HOME | End: 2024-01-16
Attending: PODIATRIST | Admitting: PODIATRIST
Payer: COMMERCIAL

## 2024-01-16 ENCOUNTER — ANESTHESIA (OUTPATIENT)
Facility: HOSPITAL | Age: 59
End: 2024-01-16
Payer: COMMERCIAL

## 2024-01-16 VITALS
DIASTOLIC BLOOD PRESSURE: 49 MMHG | HEART RATE: 61 BPM | OXYGEN SATURATION: 94 % | WEIGHT: 280 LBS | TEMPERATURE: 97.9 F | RESPIRATION RATE: 15 BRPM | BODY MASS INDEX: 47.8 KG/M2 | HEIGHT: 64 IN | SYSTOLIC BLOOD PRESSURE: 102 MMHG

## 2024-01-16 DIAGNOSIS — G89.18 POST-OP PAIN: Primary | ICD-10-CM

## 2024-01-16 LAB
GLUCOSE BLD STRIP.AUTO-MCNC: 101 MG/DL (ref 65–117)
SERVICE CMNT-IMP: NORMAL

## 2024-01-16 PROCEDURE — 3700000000 HC ANESTHESIA ATTENDED CARE: Performed by: PODIATRIST

## 2024-01-16 PROCEDURE — 6360000002 HC RX W HCPCS: Performed by: PODIATRIST

## 2024-01-16 PROCEDURE — 7100000000 HC PACU RECOVERY - FIRST 15 MIN: Performed by: PODIATRIST

## 2024-01-16 PROCEDURE — 7100000001 HC PACU RECOVERY - ADDTL 15 MIN: Performed by: PODIATRIST

## 2024-01-16 PROCEDURE — 7100000011 HC PHASE II RECOVERY - ADDTL 15 MIN: Performed by: PODIATRIST

## 2024-01-16 PROCEDURE — 2500000003 HC RX 250 WO HCPCS: Performed by: ANESTHESIOLOGY

## 2024-01-16 PROCEDURE — 2580000003 HC RX 258: Performed by: PODIATRIST

## 2024-01-16 PROCEDURE — 6360000002 HC RX W HCPCS: Performed by: ANESTHESIOLOGY

## 2024-01-16 PROCEDURE — 2709999900 HC NON-CHARGEABLE SUPPLY: Performed by: PODIATRIST

## 2024-01-16 PROCEDURE — 2500000003 HC RX 250 WO HCPCS: Performed by: PODIATRIST

## 2024-01-16 PROCEDURE — 82962 GLUCOSE BLOOD TEST: CPT

## 2024-01-16 PROCEDURE — 3600000013 HC SURGERY LEVEL 3 ADDTL 15MIN: Performed by: PODIATRIST

## 2024-01-16 PROCEDURE — 7100000010 HC PHASE II RECOVERY - FIRST 15 MIN: Performed by: PODIATRIST

## 2024-01-16 PROCEDURE — 2580000003 HC RX 258: Performed by: ANESTHESIOLOGY

## 2024-01-16 PROCEDURE — 3600000003 HC SURGERY LEVEL 3 BASE: Performed by: PODIATRIST

## 2024-01-16 PROCEDURE — 3700000001 HC ADD 15 MINUTES (ANESTHESIA): Performed by: PODIATRIST

## 2024-01-16 RX ORDER — DEXAMETHASONE SODIUM PHOSPHATE 4 MG/ML
INJECTION, SOLUTION INTRA-ARTICULAR; INTRALESIONAL; INTRAMUSCULAR; INTRAVENOUS; SOFT TISSUE PRN
Status: DISCONTINUED | OUTPATIENT
Start: 2024-01-16 | End: 2024-01-16 | Stop reason: HOSPADM

## 2024-01-16 RX ORDER — PROCHLORPERAZINE EDISYLATE 5 MG/ML
5 INJECTION INTRAMUSCULAR; INTRAVENOUS
Status: DISCONTINUED | OUTPATIENT
Start: 2024-01-16 | End: 2024-01-16 | Stop reason: HOSPADM

## 2024-01-16 RX ORDER — OXYCODONE HYDROCHLORIDE 5 MG/1
5 TABLET ORAL
Status: DISCONTINUED | OUTPATIENT
Start: 2024-01-16 | End: 2024-01-16 | Stop reason: HOSPADM

## 2024-01-16 RX ORDER — PHENYLEPHRINE HCL IN 0.9% NACL 0.4MG/10ML
SYRINGE (ML) INTRAVENOUS PRN
Status: DISCONTINUED | OUTPATIENT
Start: 2024-01-16 | End: 2024-01-16 | Stop reason: SDUPTHER

## 2024-01-16 RX ORDER — LIDOCAINE HYDROCHLORIDE AND EPINEPHRINE BITARTRATE 20; .01 MG/ML; MG/ML
INJECTION, SOLUTION SUBCUTANEOUS PRN
Status: DISCONTINUED | OUTPATIENT
Start: 2024-01-16 | End: 2024-01-16 | Stop reason: HOSPADM

## 2024-01-16 RX ORDER — TRIAMCINOLONE ACETONIDE 40 MG/ML
INJECTION, SUSPENSION INTRA-ARTICULAR; INTRAMUSCULAR PRN
Status: DISCONTINUED | OUTPATIENT
Start: 2024-01-16 | End: 2024-01-16 | Stop reason: HOSPADM

## 2024-01-16 RX ORDER — PROMETHAZINE HYDROCHLORIDE 25 MG/1
25 TABLET ORAL 4 TIMES DAILY PRN
Qty: 20 TABLET | Refills: 0 | Status: SHIPPED | OUTPATIENT
Start: 2024-01-16 | End: 2024-01-23

## 2024-01-16 RX ORDER — SODIUM CHLORIDE, SODIUM LACTATE, POTASSIUM CHLORIDE, CALCIUM CHLORIDE 600; 310; 30; 20 MG/100ML; MG/100ML; MG/100ML; MG/100ML
INJECTION, SOLUTION INTRAVENOUS CONTINUOUS PRN
Status: DISCONTINUED | OUTPATIENT
Start: 2024-01-16 | End: 2024-01-16 | Stop reason: SDUPTHER

## 2024-01-16 RX ORDER — FENTANYL CITRATE 50 UG/ML
INJECTION, SOLUTION INTRAMUSCULAR; INTRAVENOUS PRN
Status: DISCONTINUED | OUTPATIENT
Start: 2024-01-16 | End: 2024-01-16 | Stop reason: SDUPTHER

## 2024-01-16 RX ORDER — KETAMINE HCL IN NACL, ISO-OSM 100MG/10ML
SYRINGE (ML) INJECTION PRN
Status: DISCONTINUED | OUTPATIENT
Start: 2024-01-16 | End: 2024-01-16 | Stop reason: SDUPTHER

## 2024-01-16 RX ORDER — SODIUM CHLORIDE 0.9 % (FLUSH) 0.9 %
5-40 SYRINGE (ML) INJECTION EVERY 12 HOURS SCHEDULED
Status: DISCONTINUED | OUTPATIENT
Start: 2024-01-16 | End: 2024-01-16 | Stop reason: HOSPADM

## 2024-01-16 RX ORDER — DEXMEDETOMIDINE HYDROCHLORIDE 100 UG/ML
INJECTION, SOLUTION INTRAVENOUS PRN
Status: DISCONTINUED | OUTPATIENT
Start: 2024-01-16 | End: 2024-01-16 | Stop reason: SDUPTHER

## 2024-01-16 RX ORDER — SODIUM CHLORIDE 9 MG/ML
INJECTION, SOLUTION INTRAVENOUS PRN
Status: DISCONTINUED | OUTPATIENT
Start: 2024-01-16 | End: 2024-01-16 | Stop reason: HOSPADM

## 2024-01-16 RX ORDER — SODIUM CHLORIDE 0.9 % (FLUSH) 0.9 %
5-40 SYRINGE (ML) INJECTION PRN
Status: DISCONTINUED | OUTPATIENT
Start: 2024-01-16 | End: 2024-01-16 | Stop reason: HOSPADM

## 2024-01-16 RX ORDER — HYDROMORPHONE HYDROCHLORIDE 1 MG/ML
0.5 INJECTION, SOLUTION INTRAMUSCULAR; INTRAVENOUS; SUBCUTANEOUS EVERY 5 MIN PRN
Status: DISCONTINUED | OUTPATIENT
Start: 2024-01-16 | End: 2024-01-16 | Stop reason: HOSPADM

## 2024-01-16 RX ORDER — FENTANYL CITRATE 50 UG/ML
25 INJECTION, SOLUTION INTRAMUSCULAR; INTRAVENOUS EVERY 5 MIN PRN
Status: DISCONTINUED | OUTPATIENT
Start: 2024-01-16 | End: 2024-01-16 | Stop reason: HOSPADM

## 2024-01-16 RX ORDER — OXYCODONE HYDROCHLORIDE AND ACETAMINOPHEN 5; 325 MG/1; MG/1
1 TABLET ORAL EVERY 4 HOURS PRN
Qty: 15 TABLET | Refills: 0 | Status: SHIPPED | OUTPATIENT
Start: 2024-01-16 | End: 2024-01-19

## 2024-01-16 RX ORDER — MIDAZOLAM HYDROCHLORIDE 1 MG/ML
INJECTION INTRAMUSCULAR; INTRAVENOUS PRN
Status: DISCONTINUED | OUTPATIENT
Start: 2024-01-16 | End: 2024-01-16 | Stop reason: SDUPTHER

## 2024-01-16 RX ORDER — ONDANSETRON 2 MG/ML
INJECTION INTRAMUSCULAR; INTRAVENOUS PRN
Status: DISCONTINUED | OUTPATIENT
Start: 2024-01-16 | End: 2024-01-16 | Stop reason: SDUPTHER

## 2024-01-16 RX ORDER — PHENYLEPHRINE HYDROCHLORIDE 10 MG/ML
INJECTION INTRAVENOUS PRN
Status: DISCONTINUED | OUTPATIENT
Start: 2024-01-16 | End: 2024-01-16 | Stop reason: SDUPTHER

## 2024-01-16 RX ORDER — LIDOCAINE HYDROCHLORIDE 20 MG/ML
INJECTION, SOLUTION EPIDURAL; INFILTRATION; INTRACAUDAL; PERINEURAL PRN
Status: DISCONTINUED | OUTPATIENT
Start: 2024-01-16 | End: 2024-01-16 | Stop reason: SDUPTHER

## 2024-01-16 RX ORDER — ONDANSETRON 2 MG/ML
4 INJECTION INTRAMUSCULAR; INTRAVENOUS
Status: DISCONTINUED | OUTPATIENT
Start: 2024-01-16 | End: 2024-01-16 | Stop reason: HOSPADM

## 2024-01-16 RX ORDER — HYDRALAZINE HYDROCHLORIDE 20 MG/ML
10 INJECTION INTRAMUSCULAR; INTRAVENOUS
Status: DISCONTINUED | OUTPATIENT
Start: 2024-01-16 | End: 2024-01-16 | Stop reason: HOSPADM

## 2024-01-16 RX ORDER — PROPOFOL 10 MG/ML
INJECTION, EMULSION INTRAVENOUS CONTINUOUS PRN
Status: DISCONTINUED | OUTPATIENT
Start: 2024-01-16 | End: 2024-01-16 | Stop reason: SDUPTHER

## 2024-01-16 RX ADMIN — LIDOCAINE HYDROCHLORIDE 40 MG: 20 INJECTION, SOLUTION EPIDURAL; INFILTRATION; INTRACAUDAL; PERINEURAL at 07:39

## 2024-01-16 RX ADMIN — PHENYLEPHRINE HYDROCHLORIDE 40 MCG: 10 INJECTION INTRAVENOUS at 07:49

## 2024-01-16 RX ADMIN — FENTANYL CITRATE 25 MCG: 50 INJECTION, SOLUTION INTRAMUSCULAR; INTRAVENOUS at 07:39

## 2024-01-16 RX ADMIN — SODIUM CHLORIDE 1000 MG: 900 INJECTION INTRAVENOUS at 08:04

## 2024-01-16 RX ADMIN — DEXMEDETOMIDINE HYDROCHLORIDE 8 MCG: 100 INJECTION, SOLUTION, CONCENTRATE INTRAVENOUS at 08:07

## 2024-01-16 RX ADMIN — Medication 120 MCG: at 08:24

## 2024-01-16 RX ADMIN — FENTANYL CITRATE 25 MCG: 50 INJECTION, SOLUTION INTRAMUSCULAR; INTRAVENOUS at 07:52

## 2024-01-16 RX ADMIN — PROPOFOL 20 MG: 10 INJECTION, EMULSION INTRAVENOUS at 08:08

## 2024-01-16 RX ADMIN — FENTANYL CITRATE 25 MCG: 50 INJECTION, SOLUTION INTRAMUSCULAR; INTRAVENOUS at 07:13

## 2024-01-16 RX ADMIN — PROPOFOL 75 MCG/KG/MIN: 10 INJECTION, EMULSION INTRAVENOUS at 07:39

## 2024-01-16 RX ADMIN — ONDANSETRON 4 MG: 2 INJECTION INTRAMUSCULAR; INTRAVENOUS at 08:18

## 2024-01-16 RX ADMIN — DEXMEDETOMIDINE HYDROCHLORIDE 8 MCG: 100 INJECTION, SOLUTION, CONCENTRATE INTRAVENOUS at 07:39

## 2024-01-16 RX ADMIN — Medication 30 MG: at 07:39

## 2024-01-16 RX ADMIN — PHENYLEPHRINE HYDROCHLORIDE 120 MCG: 10 INJECTION INTRAVENOUS at 08:16

## 2024-01-16 RX ADMIN — Medication 10 MG: at 08:02

## 2024-01-16 RX ADMIN — PROPOFOL 20 MG: 10 INJECTION, EMULSION INTRAVENOUS at 07:42

## 2024-01-16 RX ADMIN — PROPOFOL 20 MG: 10 INJECTION, EMULSION INTRAVENOUS at 07:54

## 2024-01-16 RX ADMIN — DEXMEDETOMIDINE HYDROCHLORIDE 8 MCG: 100 INJECTION, SOLUTION, CONCENTRATE INTRAVENOUS at 07:54

## 2024-01-16 RX ADMIN — PHENYLEPHRINE HYDROCHLORIDE 120 MCG: 10 INJECTION INTRAVENOUS at 07:50

## 2024-01-16 RX ADMIN — MIDAZOLAM HYDROCHLORIDE 3 MG: 1 INJECTION, SOLUTION INTRAMUSCULAR; INTRAVENOUS at 07:27

## 2024-01-16 RX ADMIN — SODIUM CHLORIDE, SODIUM LACTATE, POTASSIUM CHLORIDE, AND CALCIUM CHLORIDE: 600; 310; 30; 20 INJECTION, SOLUTION INTRAVENOUS at 07:30

## 2024-01-16 RX ADMIN — PHENYLEPHRINE HYDROCHLORIDE 120 MCG: 10 INJECTION INTRAVENOUS at 08:09

## 2024-01-16 RX ADMIN — DEXMEDETOMIDINE HYDROCHLORIDE 8 MCG: 100 INJECTION, SOLUTION, CONCENTRATE INTRAVENOUS at 07:46

## 2024-01-16 RX ADMIN — PHENYLEPHRINE HYDROCHLORIDE 80 MCG: 10 INJECTION INTRAVENOUS at 07:54

## 2024-01-16 RX ADMIN — SODIUM CHLORIDE 2000 MG: 900 INJECTION INTRAVENOUS at 07:45

## 2024-01-16 ASSESSMENT — PAIN - FUNCTIONAL ASSESSMENT
PAIN_FUNCTIONAL_ASSESSMENT: PREVENTS OR INTERFERES SOME ACTIVE ACTIVITIES AND ADLS
PAIN_FUNCTIONAL_ASSESSMENT: 0-10

## 2024-01-16 ASSESSMENT — LIFESTYLE VARIABLES: SMOKING_STATUS: 1

## 2024-01-16 ASSESSMENT — PAIN DESCRIPTION - DESCRIPTORS: DESCRIPTORS: ACHING;SORE;THROBBING

## 2024-01-16 NOTE — DISCHARGE INSTRUCTIONS
INSTRUCTIONS FOLLOWING FOOT SURGERY    ACTIVITY:  Elevate feet for 48 hours  Use ice as directed by your doctor  Use crutches / walker as directed by your doctor, and follow your doctors instructions as to your weight bearing status - YOU CAN BEAR WEIGHT     DIET:  Clear liquids until no nausea or vomiting; then light diet for the first day  An advance to regular diet on second day, unless your doctor orders otherwise.    PAIN:  Take pain medication as directed by your doctor.  Call your doctor if pain is not relieved by medication.  Do not take aspirin or blood thinners until directed by your doctor.    DRESSING CARE: keep dressing clean and dry, do not remove       FOLLOW-UP PHONE CALLS:  Calls will be made by nursing staff.  If you had any problems, call your doctor as needed.    CALL YOUR DOCTOR IF:  Excessive bleeding that does not stop after holding mild pressure over the area  Temperature of 101° F or above  Redness, excessive swelling or bruising, and/or green or yellow, smelly discharge from incision  Loss of sensation -cold, white, or blue toes    AFTER ANESTHESIA :   For the first 24 hours: do not drive, drink alcoholic beverages, or make important decisions.  Be aware of dizziness following anesthesia and while taking pain medication.      DISCHARGE MEDICATIONS: [email or web address]      APPOINTMENT DATE/TIME: please call for an appointment    YOUR DOCTOR’S PHONE NUMBER: [phone]    Patient’s signature:  Date: 1/16/2024  Discharging Nurse:       ______________________________________________________________________    Anesthesia Discharge Instructions    After general anesthesia or intervenous sedation, for 24 hours or while taking prescription Narcotics:  Limit your activities  Do not drive or operate hazardous machinery  If you have not urinated within 8 hours after discharge, please contact your surgeon on call.  Do not make important personal or business decisions  Do not drink alcoholic

## 2024-01-16 NOTE — BRIEF OP NOTE
Brief Postoperative Note      Patient: Margo Garcia  YOB: 1965  MRN: [id number]    Date of Procedure: 1/16/2024    Pre-Op Diagnosis Codes:     * Plantar fasciitis, right [M72.2]    Post-Op Diagnosis: Same       Procedure(s):  RIGHT FOOT ENDOSCOPIC PLANTAR FASCIOTOMY    Surgeon(s):  Brenda Yen DPM Tammaro, Sarah, DPM    Assistant:  * No surgical staff found *    Anesthesia: Monitor Anesthesia Care    Estimated Blood Loss (mL): less than 50     Complications: None    Specimens:   * No specimens in log *    Implants:  * No implants in log *      Drains: * No LDAs found *    Findings: thickened plantar fascia visualized      Electronically signed by Jo Crespo DPM on 1/16/2024 at 8:36 AM

## 2024-01-16 NOTE — PROGRESS NOTES
Discharge instructions reviewed with patient and family using teachback. All questions have been answered. Prescriptions sent to [facility] pharmacy. Vital signs stable, pain appropriately managed. Patient wheeled off the unit with PACU staff.

## 2024-01-16 NOTE — ANESTHESIA POSTPROCEDURE EVALUATION
Department of Anesthesiology  Postprocedure Note    Patient: Margo Garcia  MRN: [id number]  YOB: 1965  Date of evaluation: 1/16/2024    Procedure Summary       Date: 01/16/24 Room / Location: [facility] MAIN OR [address] [place] [address] MAIN OR    Anesthesia Start: 0730 Anesthesia Stop: 0837    Procedure: RIGHT FOOT ENDOSCOPIC PLANTAR FASCIOTOMY (Right: Foot) Diagnosis:       Plantar fasciitis, right      (Plantar fasciitis, right [M72.2])    Providers: Brenda Yen DPM Responsible Provider: Mayuri Berrios DO    Anesthesia Type: MAC ASA Status: 3            Anesthesia Type: No value filed.    Topher Phase I: Topher Score: 8    Topher Phase II:      Anesthesia Post Evaluation    Patient location during evaluation: PACU  Level of consciousness: awake  Airway patency: patent  Nausea & Vomiting: no nausea  Cardiovascular status: hemodynamically stable  Respiratory status: acceptable  Hydration status: stable  Multimodal analgesia pain management approach  Pain management: adequate    No notable events documented.

## 2024-01-16 NOTE — ANESTHESIA PRE PROCEDURE
Mayuri I, DO   40 mg at 01/16/24 0739    phenylephrine (VAZCULEP) injection   IntraVENous PRN Mayuri Berrios, DO   120 mcg at 01/16/24 0809       Allergies:    Allergies   Allergen Reactions    Other Other (See Comments)     Dissolvable Stitches DO NO DISSOLVE in patient        Problem List:    Patient Active Problem List   Diagnosis Code    Secondary hypertension I15.9    Tremor, unspecified R25.1    AMEYA (generalized anxiety disorder) F41.1    Insomnia G47.00    Other hyperlipidemia E78.49    Hand fracture, right S62.91XA    MELISSA treated with BiPAP G47.33    RLS (restless legs syndrome) G25.81    Primary osteoarthritis of left hip M16.12    Morbid obesity with BMI of 45.0-49.9, adult ([facility]) E66.01, Z68.42    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 SKYLAR CHRISTIE    CHOLECYSTECTOMY  2006    COLONOSCOPY      LUMBAR DISCECTOMY  2000    DR. SKYLAR CHRISTIE    LUMBAR FUSION  2004    DR. SKYLAR CHRISTIE    LEONARDO STEROTACTIC LOC BREAST BIOPSY LEFT Left ?    benign    TONSILLECTOMY      CHILD    TOTAL HIP ARTHROPLASTY Left 07/12/2022    TUBAL LIGATION  1999    WISDOM TOOTH EXTRACTION  1980's    X2       Social History:    Social History     Tobacco Use    Smoking status: Former    Smokeless tobacco: Never   Substance Use Topics    Alcohol use: Not Currently                                Counseling given: Not Answered      Vital Signs (Current):   Vitals:    01/16/24 0645   BP: 121/62   Pulse: 66   Resp: 18   Temp: 98.7 °F (37.1 °C)   TempSrc: Oral   SpO2: 94%

## 2024-01-16 NOTE — OP NOTE
Operative Note      Patient: Margo Garcia  YOB: 1965  MRN: [id number]    Date of Procedure: 1/16/2024    Pre-Op Diagnosis Codes:     * Plantar fasciitis, right [M72.2]    Post-Op Diagnosis: Same       Procedure(s):  RIGHT FOOT ENDOSCOPIC PLANTAR FASCIOTOMY    Surgeon(s):  Brenda Yen DPM Tammaro, Sarah, DPM    Assistant:   * No surgical staff found *    Anesthesia: Monitor Anesthesia Care    Estimated Blood Loss (mL): Minimal    Complications: None    Specimens:   * No specimens in log *    Implants:  * No implants in log *      Drains: * No LDAs found *    Findings:  thickened plantar fascia visualized        Detailed Description of Procedure:   Patient was wheeled to the operating room and placed on the operating table in the supine position. MAC anesthesia was achieved by the anesthesia team and a supplementary local block consisting of 20cc of 1% lidocaine with epi was injected at the medial and lateral heel. An ankle tourniquet was applied to the right ankle and the right foot was prepped and draped in the normal aseptic technique. The ankle tourniquet was inflated to 250mmhg. Attention was drawn to the medial heel where a stab incision was made with a #15 blade and the fascia was identified with an elevator before inserting the cannula and trocar. A stab incision was made laterally with a #15 blade and the cannula was pushed through. The cannula was flossed with q-tips and a gauze. The scope was introduced laterally and the fascia was visualized and the medial 2/3 was transected with a hook blade and triangle blade until the underlying muscle belly was visualized. The site was flushed with saline. The incisions were closed with 3-0 nylon sutures. 2cc decadron and 1cc of kenalog were injected in to the surgical site. The ankle tourniquet was deflated and an immediate hyperemic response was noted to all digits of the right foot. The foot was dressed with xeroform, 4x4 gauze, ivonne,

## 2025-07-15 ENCOUNTER — HOSPITAL ENCOUNTER (OUTPATIENT)
Facility: HOSPITAL | Age: 60
Discharge: HOME | End: 2025-07-18
Payer: COMMERCIAL

## 2025-07-15 VITALS — BODY MASS INDEX: 36.47 KG/M2 | HEIGHT: 64 IN | WEIGHT: 213.6 LBS

## 2025-07-15 DIAGNOSIS — Z12.31 VISIT FOR SCREENING MAMMOGRAM: ICD-10-CM

## 2025-07-15 PROCEDURE — 77067 SCR MAMMO BI INCL CAD: CPT

## (undated) DEVICE — ZIMMER® STERILE DISPOSABLE TOURNIQUET CUFF WITH PLC, DUAL PORT, SINGLE BLADDER, 34 IN. (86 CM)

## (undated) DEVICE — BLADE SAW W073XL276IN THK0031IN CUT THK0036IN REPL SAG

## (undated) DEVICE — HEWSON SUTURE RETRIEVER: Brand: HEWSON SUTURE RETRIEVER

## (undated) DEVICE — DRESSING HYDROCOLLOID BORDER 35X10 IN ALUM PRIMASEAL

## (undated) DEVICE — CONTAINER,SPEC,PNEUM TUBE,3OZ,STRL PATH: Brand: MEDLINE

## (undated) DEVICE — Device

## (undated) DEVICE — SUTURE PDS II SZ 0 L36IN ABSRB VLT L40MM CT 1/2 CIR Z358T

## (undated) DEVICE — SUTURE ETHLN SZ 2-0 L18IN NONABSORBABLE BLK L26MM FS 3/8 664G

## (undated) DEVICE — COVER LT HNDL PLAS RIG 1 PER PK

## (undated) DEVICE — ZIMMER® STERILE DISPOSABLE TOURNIQUET CUFF WITH PLC, DUAL PORT, SINGLE BLADDER, 24 IN. (61 CM)

## (undated) DEVICE — KIT EVAC 0.13IN RECT TB DIA10FR 400CC PVC 3 SPR Y CONN DRN

## (undated) DEVICE — BANDAGE,ELASTIC,ESMARK,STERILE,4"X9',LF: Brand: MEDLINE

## (undated) DEVICE — TOTAL TRAY, 16FR 10ML SIL FOLEY, URN: Brand: MEDLINE

## (undated) DEVICE — SET EXTN 27ML TBNG L20IN DIA0100IN MACBOR FEM ADPT SLDE

## (undated) DEVICE — 4-PORT MANIFOLD: Brand: NEPTUNE 2

## (undated) DEVICE — TRAY PREP DRY W/ PREM GLV 2 APPL 6 SPNG 2 UNDPD 1 OVERWRAP

## (undated) DEVICE — DRESSING PETRO W3XL3IN OIL EMUL N ADH GZ KNIT IMPREG CELOS

## (undated) DEVICE — SOLUTION IRRIG 1000ML 0.9% SOD CHL USP POUR PLAS BTL

## (undated) DEVICE — SOLUTION IRRIG 3000ML LAC RINGERS ARTHROMTC PLAS CONT

## (undated) DEVICE — CONTAINER,SPECIMEN,3OZ,OR STRL: Brand: MEDLINE

## (undated) DEVICE — BANDAGE,GAUZE,BULKEE II,4.5"X4.1YD,STRL: Brand: MEDLINE

## (undated) DEVICE — SUTURE MCRYL SZ 2-0 L27IN ABSRB VLT CT-2 L26MM 1/2 CIR Y333H

## (undated) DEVICE — SOLUTION SURG PREP 26 CC PURPREP

## (undated) DEVICE — HOOK/TRIANGLE BLADE KIT: Brand: ENDOTRAC

## (undated) DEVICE — SUTURE PDS II SZ 4-0 L18IN ABSRB UD L19MM PS-2 3/8 CIR PRIM Z496G

## (undated) DEVICE — APPLICATOR  COTTON-TIPPED 6 IN WOOD STRL

## (undated) DEVICE — SUTURE PDS II SZ 2-0 L36IN ABSRB VLT CT L40MM 1/2 CIR TAPR Z357H

## (undated) DEVICE — CONTAINER,SPECIMEN,4OZ,OR STRL: Brand: MEDLINE

## (undated) DEVICE — EXTREMITY - SMH: Brand: MEDLINE INDUSTRIES, INC.

## (undated) DEVICE — 1010 S-DRAPE TOWEL DRAPE 10/BX: Brand: STERI-DRAPE™

## (undated) DEVICE — TOTAL JOINT - SMH: Brand: MEDLINE INDUSTRIES, INC.

## (undated) DEVICE — BANDAGE COMPR W4INXL5YD WHT BGE POLY COT M E WRP WV HK AND

## (undated) DEVICE — SUT ETHLN 3-0 18IN PS2 BLK --

## (undated) DEVICE — T4 HOOD

## (undated) DEVICE — HYPODERMIC SAFETY NEEDLE: Brand: MAGELLAN

## (undated) DEVICE — SUTURE NONABSORBABLE MONOFILAMENT 3-0 PS-1 18 IN BLK ETHILON 1663H

## (undated) DEVICE — SYR 20ML LL STRL LF --

## (undated) DEVICE — SOLUTION IRRIG 1000ML STRL H2O USP PLAS POUR BTL

## (undated) DEVICE — GLOVE ORTHO 8   MSG9480

## (undated) DEVICE — HYPODERMIC SAFETY NEEDLE: Brand: MONOJECT

## (undated) DEVICE — MINOR BASIN -SMH: Brand: MEDLINE INDUSTRIES, INC.

## (undated) DEVICE — SYRINGE MED 10ML LUERLOCK TIP W/O SFTY DISP

## (undated) DEVICE — GLOVE SURG SZ 8 L12IN FNGR THK94MIL STD WHT LTX FREE

## (undated) DEVICE — SCRUB DRY SURG EZ SCRUB BRUSH PREOPERATIVE GRN

## (undated) DEVICE — DYONICS 25 INFLOW/OUTFLOW TUBE                                    SET, SINGLE SUCTION, 3 PER BOX

## (undated) DEVICE — SUTURE VCRL SZ 2-0 L36IN ABSRB UD L40MM CT 1/2 CIR J957H

## (undated) DEVICE — Device: Brand: JELCO

## (undated) DEVICE — BLADE RMFG SAG LG 19.1X70X1MM --

## (undated) DEVICE — BANDAGE,GAUZE,CONFORMING,4"X75",STRL,LF: Brand: MEDLINE

## (undated) DEVICE — DRAPE,REIN 53X77,STERILE: Brand: MEDLINE

## (undated) DEVICE — PAD BD MATTRESS 73X32 IN STD CONVOLUTED FOAM LTX FREE

## (undated) DEVICE — SOLUTION IRRIG 3000ML 0.9% SOD CHL USP UROMATIC PLAS CONT

## (undated) DEVICE — GLOVE ORANGE PI 7 1/2   MSG9075

## (undated) DEVICE — SUTURE VCRL SZ 2 L54IN ABSRB UD L65MM TP-1 1/2 CIR J880T

## (undated) DEVICE — COVER,MAYO STAND,STERILE: Brand: MEDLINE

## (undated) DEVICE — BLADE,CARBON-STEEL,15,STRL,DISPOSABLE,TB: Brand: MEDLINE